# Patient Record
Sex: MALE | Race: WHITE | NOT HISPANIC OR LATINO | Employment: OTHER | URBAN - METROPOLITAN AREA
[De-identification: names, ages, dates, MRNs, and addresses within clinical notes are randomized per-mention and may not be internally consistent; named-entity substitution may affect disease eponyms.]

---

## 2017-10-21 ENCOUNTER — RESULTS ENCOUNTER (OUTPATIENT)
Dept: INTERNAL MEDICINE | Facility: CLINIC | Age: 57
End: 2017-10-21

## 2017-10-21 DIAGNOSIS — Z00.00 HEALTH CARE MAINTENANCE: ICD-10-CM

## 2017-10-24 LAB
ALBUMIN SERPL-MCNC: 4.5 G/DL (ref 3.5–5.2)
ALBUMIN/GLOB SERPL: 1.9 G/DL
ALP SERPL-CCNC: 64 U/L (ref 39–117)
ALT SERPL-CCNC: 26 U/L (ref 1–41)
AST SERPL-CCNC: 19 U/L (ref 1–40)
BILIRUB SERPL-MCNC: 0.3 MG/DL (ref 0.1–1.2)
BUN SERPL-MCNC: 15 MG/DL (ref 6–20)
BUN/CREAT SERPL: 16.1 (ref 7–25)
CALCIUM SERPL-MCNC: 9.1 MG/DL (ref 8.6–10.5)
CHLORIDE SERPL-SCNC: 103 MMOL/L (ref 98–107)
CHOLEST SERPL-MCNC: 159 MG/DL (ref 0–200)
CO2 SERPL-SCNC: 25.6 MMOL/L (ref 22–29)
CREAT SERPL-MCNC: 0.93 MG/DL (ref 0.76–1.27)
FT4I SERPL CALC-MCNC: 1.6 (ref 1.2–4.9)
GFR SERPLBLD CREATININE-BSD FMLA CKD-EPI: 102 ML/MIN/1.73
GFR SERPLBLD CREATININE-BSD FMLA CKD-EPI: 84 ML/MIN/1.73
GLOBULIN SER CALC-MCNC: 2.4 GM/DL
GLUCOSE SERPL-MCNC: 82 MG/DL (ref 65–99)
HCV AB S/CO SERPL IA: 0.1 S/CO RATIO (ref 0–0.9)
HDLC SERPL-MCNC: 54 MG/DL (ref 40–60)
LDLC SERPL CALC-MCNC: 91 MG/DL (ref 0–100)
LDLC/HDLC SERPL: 1.69 {RATIO}
POTASSIUM SERPL-SCNC: 4.8 MMOL/L (ref 3.5–5.2)
PROT SERPL-MCNC: 6.9 G/DL (ref 6–8.5)
PSA SERPL-MCNC: 0.92 NG/ML (ref 0–4)
SODIUM SERPL-SCNC: 142 MMOL/L (ref 136–145)
T3RU NFR SERPL: 25 % (ref 24–39)
T4 SERPL-MCNC: 6.5 UG/DL (ref 4.5–12)
TRIGL SERPL-MCNC: 69 MG/DL (ref 0–150)
TSH SERPL DL<=0.005 MIU/L-ACNC: 2.29 UIU/ML (ref 0.45–4.5)
VLDLC SERPL CALC-MCNC: 13.8 MG/DL (ref 5–40)

## 2017-10-30 ENCOUNTER — OFFICE VISIT (OUTPATIENT)
Dept: INTERNAL MEDICINE | Facility: CLINIC | Age: 57
End: 2017-10-30

## 2017-10-30 VITALS
WEIGHT: 159.9 LBS | SYSTOLIC BLOOD PRESSURE: 106 MMHG | OXYGEN SATURATION: 99 % | DIASTOLIC BLOOD PRESSURE: 60 MMHG | BODY MASS INDEX: 25.7 KG/M2 | HEART RATE: 76 BPM | HEIGHT: 66 IN

## 2017-10-30 DIAGNOSIS — D64.9 ANEMIA, UNSPECIFIED TYPE: Primary | ICD-10-CM

## 2017-10-30 DIAGNOSIS — Z00.00 HEALTH CARE MAINTENANCE: ICD-10-CM

## 2017-10-30 LAB
BASOPHILS # BLD AUTO: 0.02 10*3/MM3 (ref 0–0.2)
BASOPHILS NFR BLD AUTO: 0.6 % (ref 0–1.5)
EOSINOPHIL # BLD AUTO: 0.05 10*3/MM3 (ref 0–0.7)
EOSINOPHIL NFR BLD AUTO: 1.6 % (ref 0.3–6.2)
ERYTHROCYTE [DISTWIDTH] IN BLOOD BY AUTOMATED COUNT: 17.2 % (ref 11.5–14.5)
HCT VFR BLD AUTO: 38.3 % (ref 40.4–52.2)
HGB BLD-MCNC: 11.6 G/DL (ref 13.7–17.6)
IMM GRANULOCYTES # BLD: 0 10*3/MM3 (ref 0–0.03)
IMM GRANULOCYTES NFR BLD: 0 % (ref 0–0.5)
IRON SATN MFR SERPL: 5 % (ref 20–50)
IRON SERPL-MCNC: 25 MCG/DL (ref 59–158)
LYMPHOCYTES # BLD AUTO: 0.74 10*3/MM3 (ref 0.9–4.8)
LYMPHOCYTES NFR BLD AUTO: 23.1 % (ref 19.6–45.3)
MCH RBC QN AUTO: 25.7 PG (ref 27–32.7)
MCHC RBC AUTO-ENTMCNC: 30.3 G/DL (ref 32.6–36.4)
MCV RBC AUTO: 84.9 FL (ref 79.8–96.2)
MONOCYTES # BLD AUTO: 0.36 10*3/MM3 (ref 0.2–1.2)
MONOCYTES NFR BLD AUTO: 11.3 % (ref 5–12)
NEUTROPHILS # BLD AUTO: 2.03 10*3/MM3 (ref 1.9–8.1)
NEUTROPHILS NFR BLD AUTO: 63.4 % (ref 42.7–76)
PLATELET # BLD AUTO: 346 10*3/MM3 (ref 140–500)
RBC # BLD AUTO: 4.51 10*6/MM3 (ref 4.6–6)
TIBC SERPL-MCNC: 455 MCG/DL
UIBC SERPL-MCNC: 430 MCG/DL
WBC # BLD AUTO: 3.2 10*3/MM3 (ref 4.5–10.7)

## 2017-10-30 PROCEDURE — 90471 IMMUNIZATION ADMIN: CPT | Performed by: INTERNAL MEDICINE

## 2017-10-30 PROCEDURE — 90686 IIV4 VACC NO PRSV 0.5 ML IM: CPT | Performed by: INTERNAL MEDICINE

## 2017-10-30 PROCEDURE — 99396 PREV VISIT EST AGE 40-64: CPT | Performed by: INTERNAL MEDICINE

## 2017-10-30 NOTE — PROGRESS NOTES
"Subjective   Travis Taylor is a 57 y.o. male and is here for a comprehensive physical exam. The patient reports problems - anemia.    He gives blood every 2 months.  He was told 1x he was too anemic    Do you take any herbs or supplements that were not prescribed by a doctor? See list on       Social History:   Social History     Social History   • Marital status:      Spouse name: JOSE   • Number of children: 2   • Years of education: N/A     Occupational History   • [employer],       Social History Main Topics   • Smoking status: Never Smoker   • Smokeless tobacco: Never Used   • Alcohol use Yes      Comment: socially.   • Drug use: No   • Sexual activity: Not on file     Other Topics Concern   • Not on file     Social History Narrative    2 grown children       Family History:   Family History   Problem Relation Age of Onset   • Hypertension Mother    • Stroke Father 85   • Lung cancer Paternal Grandfather      & possible mouth cancer; was a smoker.        Past Medical History: History reviewed. No pertinent past medical history.        Review of Systems    A comprehensive review of systems was negative.    Objective   /60  Pulse 76  Ht 66\" (167.6 cm)  Wt 159 lb 14.4 oz (72.5 kg)  SpO2 99%  BMI 25.81 kg/m2    General Appearance:    Alert, cooperative, no distress, appears stated age   Head:    Normocephalic, without obvious abnormality, atraumatic   Eyes:    PERRL, conjunctiva/corneas clear, EOM's intact, fundi     benign, both eyes        Ears:    Normal TM's and external ear canals, both ears   Nose:   Nares normal, septum midline, mucosa normal, no drainage    or sinus tenderness   Throat:   Lips, mucosa, and tongue normal; teeth and gums normal   Neck:   Supple, symmetrical, trachea midline, no adenopathy;        thyroid:  No enlargement/tenderness/nodules; no carotid    bruit or JVD   Back:     Symmetric, no curvature, ROM normal, no CVA tenderness   Lungs:     Clear to " auscultation bilaterally, respirations unlabored   Chest wall:    No tenderness or deformity   Heart:    Regular rate and rhythm, S1 and S2 normal, no murmur, rub   or gallop   Abdomen:     Soft, non-tender, bowel sounds active all four quadrants,     no masses, no organomegaly           Extremities:   Extremities normal, atraumatic, no cyanosis or edema   Pulses:   2+ and symmetric all extremities   Skin:   Skin color, texture, turgor normal, no rashes or lesions   Lymph nodes:   Cervical, supraclavicular, and axillary nodes normal   Neurologic:   CNII-XII intact. Normal strength, sensation and reflexes       throughout         Medications:   Current Outpatient Prescriptions:   •  Calcium-Magnesium-Vitamin D (CALCIUM 1200+D3 PO), Take 1 capsule by mouth daily., Disp: , Rfl:   •  Multiple Vitamin (MULTIVITAMIN) tablet, Take 1 tablet by mouth daily., Disp: , Rfl:   •  Multiple Vitamins-Minerals (ANTIOXIDANT PO), Take 1 tablet by mouth daily., Disp: , Rfl:   •  vitamin C (ASCORBIC ACID) 500 MG tablet, Take 500 mg by mouth daily., Disp: , Rfl:   •  Zinc 50 MG capsule, Take 1 capsule by mouth daily., Disp: , Rfl:        Assessment/Plan   Healthy male exam.      1. Healthcare Maintenance:  2. Patient Counseling:  --Nutrition: Stressed importance of moderation in sodium/caffeine intake, saturated fat and cholesterol, caloric balance, sufficient intake of fresh fruits, vegetables, fiber, calcium and vit D  --Exercise: I have rec 30 minute  --Substance Abuse: no tob occas etoh  --Dental health: He does go to the dentis reg  --Immunizations reviewed.UTD  --Discussed benefits of screening colonoscopy repeat 2025  3.  Anemia- patient has had a slightly low hemoglobin a couple of times and he went to give blood.  He is taking a multivitamin a longer has iron in it and he donates blood at least once every 2 months.  I'm going to recheck his iron level today.  I did advise him that he may need to take iron for a week after his  blood donation

## 2017-11-01 ENCOUNTER — TELEPHONE (OUTPATIENT)
Dept: INTERNAL MEDICINE | Facility: CLINIC | Age: 57
End: 2017-11-01

## 2017-11-01 DIAGNOSIS — D50.9 IRON DEFICIENCY ANEMIA, UNSPECIFIED IRON DEFICIENCY ANEMIA TYPE: Primary | ICD-10-CM

## 2017-11-01 NOTE — TELEPHONE ENCOUNTER
Pt states that he donates blood every 8 weeks and will recheck his labs in 2 weeks but wants to wait on the iron supp to see if his body regulates with the iron storage. If he is still low he said that he will start the iron supp. He donates blood every 8 weeks. Lab ordered for 2 weeks and appt. Scheduled for labs.

## 2017-11-01 NOTE — TELEPHONE ENCOUNTER
----- Message from Carlie Dean sent at 11/1/2017  9:40 AM EDT -----      ----- Message -----     From: Dawna Lima MD     Sent: 10/31/2017  12:57 PM       To: Carlie Salguero MA    Patient's iron level is low and he is a little bit anemic.  He needs to take some iron every day for the next couple of weeks and then recheck an iron level and a CBC.  Make sure he is up-to-date with his colonoscopy

## 2017-11-13 ENCOUNTER — RESULTS ENCOUNTER (OUTPATIENT)
Dept: INTERNAL MEDICINE | Facility: CLINIC | Age: 57
End: 2017-11-13

## 2017-11-13 DIAGNOSIS — D50.9 IRON DEFICIENCY ANEMIA, UNSPECIFIED IRON DEFICIENCY ANEMIA TYPE: ICD-10-CM

## 2017-11-14 LAB
BASOPHILS # BLD AUTO: 0.03 10*3/MM3 (ref 0–0.2)
BASOPHILS NFR BLD AUTO: 0.8 % (ref 0–1.5)
EOSINOPHIL # BLD AUTO: 0.08 10*3/MM3 (ref 0–0.7)
EOSINOPHIL NFR BLD AUTO: 2.2 % (ref 0.3–6.2)
ERYTHROCYTE [DISTWIDTH] IN BLOOD BY AUTOMATED COUNT: 17 % (ref 11.5–14.5)
HCT VFR BLD AUTO: 38.4 % (ref 40.4–52.2)
HGB BLD-MCNC: 11.5 G/DL (ref 13.7–17.6)
IMM GRANULOCYTES # BLD: 0 10*3/MM3 (ref 0–0.03)
IMM GRANULOCYTES NFR BLD: 0 % (ref 0–0.5)
IRON SATN MFR SERPL: 5 % (ref 20–50)
IRON SERPL-MCNC: 25 MCG/DL (ref 59–158)
LYMPHOCYTES # BLD AUTO: 0.8 10*3/MM3 (ref 0.9–4.8)
LYMPHOCYTES NFR BLD AUTO: 22 % (ref 19.6–45.3)
MCH RBC QN AUTO: 25.4 PG (ref 27–32.7)
MCHC RBC AUTO-ENTMCNC: 29.9 G/DL (ref 32.6–36.4)
MCV RBC AUTO: 85 FL (ref 79.8–96.2)
MONOCYTES # BLD AUTO: 0.59 10*3/MM3 (ref 0.2–1.2)
MONOCYTES NFR BLD AUTO: 16.2 % (ref 5–12)
NEUTROPHILS # BLD AUTO: 2.14 10*3/MM3 (ref 1.9–8.1)
NEUTROPHILS NFR BLD AUTO: 58.8 % (ref 42.7–76)
PLATELET # BLD AUTO: 373 10*3/MM3 (ref 140–500)
RBC # BLD AUTO: 4.52 10*6/MM3 (ref 4.6–6)
TIBC SERPL-MCNC: 477 MCG/DL
UIBC SERPL-MCNC: 452 MCG/DL
WBC # BLD AUTO: 3.64 10*3/MM3 (ref 4.5–10.7)

## 2017-11-22 ENCOUNTER — TELEPHONE (OUTPATIENT)
Dept: INTERNAL MEDICINE | Facility: CLINIC | Age: 57
End: 2017-11-22

## 2017-11-22 NOTE — TELEPHONE ENCOUNTER
PT. HAD LABS DRAWN TO RECHECK ANEMIA DUE TO DONATING BLOOD EVERY 8 WEEKS. MESSAGE SENT TO DR. POWELL TO ADVISE F/U ON ANEMIA.

## 2017-11-27 ENCOUNTER — TELEPHONE (OUTPATIENT)
Dept: INTERNAL MEDICINE | Facility: CLINIC | Age: 57
End: 2017-11-27

## 2017-11-27 NOTE — TELEPHONE ENCOUNTER
----- Message from Dawna Lima MD sent at 11/27/2017  1:14 PM EST -----  Hold off on donating blood for a few months take some iron daily with vitc or juice and recheck in 2 months an iron panel and cbc  ----- Message -----     From: Carliemigue Dean     Sent: 11/22/2017   4:08 PM       To: Dawna Lima MD    PT. HAD LABS DRAWN FOR RECHECK ANEMIA DUE TO DONATING BLOOD EVERY 8 WEEKS, PLEASE ADVISE.   HIS NEXT OV IS NOT UNTIL 1 YEAR.     DISCUSSED WITH PATIENT

## 2018-03-15 DIAGNOSIS — D50.9 IRON DEFICIENCY ANEMIA, UNSPECIFIED IRON DEFICIENCY ANEMIA TYPE: Primary | ICD-10-CM

## 2018-03-16 LAB
BASOPHILS # BLD AUTO: 0.02 10*3/MM3 (ref 0–0.2)
BASOPHILS NFR BLD AUTO: 0.6 % (ref 0–1.5)
EOSINOPHIL # BLD AUTO: 0.08 10*3/MM3 (ref 0–0.7)
EOSINOPHIL NFR BLD AUTO: 2.4 % (ref 0.3–6.2)
ERYTHROCYTE [DISTWIDTH] IN BLOOD BY AUTOMATED COUNT: 18 % (ref 11.5–14.5)
HCT VFR BLD AUTO: 47.6 % (ref 40.4–52.2)
HGB BLD-MCNC: 15.3 G/DL (ref 13.7–17.6)
IMM GRANULOCYTES # BLD: 0 10*3/MM3 (ref 0–0.03)
IMM GRANULOCYTES NFR BLD: 0 % (ref 0–0.5)
IRON SERPL-MCNC: 75 MCG/DL (ref 59–158)
LYMPHOCYTES # BLD AUTO: 0.71 10*3/MM3 (ref 0.9–4.8)
LYMPHOCYTES NFR BLD AUTO: 20.9 % (ref 19.6–45.3)
MCH RBC QN AUTO: 30 PG (ref 27–32.7)
MCHC RBC AUTO-ENTMCNC: 32.1 G/DL (ref 32.6–36.4)
MCV RBC AUTO: 93.3 FL (ref 79.8–96.2)
MONOCYTES # BLD AUTO: 0.56 10*3/MM3 (ref 0.2–1.2)
MONOCYTES NFR BLD AUTO: 16.5 % (ref 5–12)
NEUTROPHILS # BLD AUTO: 2.02 10*3/MM3 (ref 1.9–8.1)
NEUTROPHILS NFR BLD AUTO: 59.6 % (ref 42.7–76)
PLATELET # BLD AUTO: 264 10*3/MM3 (ref 140–500)
RBC # BLD AUTO: 5.1 10*6/MM3 (ref 4.6–6)
WBC # BLD AUTO: 3.39 10*3/MM3 (ref 4.5–10.7)

## 2018-03-23 ENCOUNTER — TELEPHONE (OUTPATIENT)
Dept: INTERNAL MEDICINE | Facility: CLINIC | Age: 58
End: 2018-03-23

## 2018-03-23 DIAGNOSIS — D50.9 IRON DEFICIENCY ANEMIA, UNSPECIFIED IRON DEFICIENCY ANEMIA TYPE: Primary | ICD-10-CM

## 2018-03-23 NOTE — TELEPHONE ENCOUNTER
PT AWARE . LABS ORDERED    ----- Message from Dawna Lima MD sent at 3/23/2018  7:35 AM EDT -----  Regarding: RE: LAB RESULT QUESTIONS  Contact: [phone]  hld iron and recheck cbc and iron in 2months  If ok then he may resume donating blood    ----- Message -----  From: Carlie Salugero MA  Sent: 3/22/2018   2:14 PM  To: Dawna Lima MD  Subject: FW: LAB RESULT QUESTIONS                         PT HAS BEEN TAKING AN IRON SUPPLEMENT, DOES HE CONTINUE? ALSO DO YOU THINK HE CAN CONTINUE TO DONATE BLOOD?  ----- Message -----  From: Ramona Landry  Sent: 3/22/2018   1:32 PM  To: Carlie Salguero MA  Subject: LAB RESULT QUESTIONS                             Patient received your message about his lab results. He would like for you to call him concerning some questions he has. Thanks

## 2018-05-07 ENCOUNTER — CLINICAL SUPPORT (OUTPATIENT)
Dept: INTERNAL MEDICINE | Facility: CLINIC | Age: 58
End: 2018-05-07

## 2018-05-07 DIAGNOSIS — Z23 IMMUNIZATION DUE: Primary | ICD-10-CM

## 2018-05-07 PROCEDURE — 90471 IMMUNIZATION ADMIN: CPT | Performed by: INTERNAL MEDICINE

## 2018-05-07 PROCEDURE — 90632 HEPA VACCINE ADULT IM: CPT | Performed by: INTERNAL MEDICINE

## 2018-05-23 ENCOUNTER — RESULTS ENCOUNTER (OUTPATIENT)
Dept: INTERNAL MEDICINE | Facility: CLINIC | Age: 58
End: 2018-05-23

## 2018-05-23 DIAGNOSIS — D50.9 IRON DEFICIENCY ANEMIA, UNSPECIFIED IRON DEFICIENCY ANEMIA TYPE: ICD-10-CM

## 2018-05-25 LAB
BASOPHILS # BLD AUTO: 0.02 10*3/MM3 (ref 0–0.2)
BASOPHILS NFR BLD AUTO: 0.7 % (ref 0–1.5)
EOSINOPHIL # BLD AUTO: 0.07 10*3/MM3 (ref 0–0.7)
EOSINOPHIL NFR BLD AUTO: 2.4 % (ref 0.3–6.2)
ERYTHROCYTE [DISTWIDTH] IN BLOOD BY AUTOMATED COUNT: 14.2 % (ref 11.5–14.5)
HCT VFR BLD AUTO: 45 % (ref 40.4–52.2)
HGB BLD-MCNC: 14.2 G/DL (ref 13.7–17.6)
IMM GRANULOCYTES # BLD: 0 10*3/MM3 (ref 0–0.03)
IMM GRANULOCYTES NFR BLD: 0 % (ref 0–0.5)
IRON SERPL-MCNC: 96 MCG/DL (ref 59–158)
LYMPHOCYTES # BLD AUTO: 0.68 10*3/MM3 (ref 0.9–4.8)
LYMPHOCYTES NFR BLD AUTO: 23.4 % (ref 19.6–45.3)
MCH RBC QN AUTO: 31.3 PG (ref 27–32.7)
MCHC RBC AUTO-ENTMCNC: 31.6 G/DL (ref 32.6–36.4)
MCV RBC AUTO: 99.3 FL (ref 79.8–96.2)
MONOCYTES # BLD AUTO: 0.34 10*3/MM3 (ref 0.2–1.2)
MONOCYTES NFR BLD AUTO: 11.7 % (ref 5–12)
NEUTROPHILS # BLD AUTO: 1.8 10*3/MM3 (ref 1.9–8.1)
NEUTROPHILS NFR BLD AUTO: 61.8 % (ref 42.7–76)
PLATELET # BLD AUTO: 255 10*3/MM3 (ref 140–500)
RBC # BLD AUTO: 4.53 10*6/MM3 (ref 4.6–6)
WBC # BLD AUTO: 2.91 10*3/MM3 (ref 4.5–10.7)

## 2018-05-29 DIAGNOSIS — D72.819 LEUKOPENIA, UNSPECIFIED TYPE: Primary | ICD-10-CM

## 2018-05-30 LAB
FOLATE SERPL-MCNC: >20 NG/ML (ref 4.78–24.2)
Lab: NORMAL
VIT B12 SERPL-MCNC: 464 PG/ML (ref 211–946)
WRITTEN AUTHORIZATION: NORMAL

## 2018-06-06 ENCOUNTER — APPOINTMENT (OUTPATIENT)
Dept: OTHER | Facility: HOSPITAL | Age: 58
End: 2018-06-06

## 2018-06-28 ENCOUNTER — LAB (OUTPATIENT)
Dept: OTHER | Facility: HOSPITAL | Age: 58
End: 2018-06-28

## 2018-06-28 ENCOUNTER — CONSULT (OUTPATIENT)
Dept: ONCOLOGY | Facility: CLINIC | Age: 58
End: 2018-06-28

## 2018-06-28 VITALS
TEMPERATURE: 98.7 F | WEIGHT: 157 LBS | OXYGEN SATURATION: 97 % | HEIGHT: 67 IN | DIASTOLIC BLOOD PRESSURE: 80 MMHG | HEART RATE: 77 BPM | SYSTOLIC BLOOD PRESSURE: 122 MMHG | BODY MASS INDEX: 24.64 KG/M2 | RESPIRATION RATE: 16 BRPM

## 2018-06-28 DIAGNOSIS — D72.819 LEUKOPENIA, UNSPECIFIED TYPE: Primary | ICD-10-CM

## 2018-06-28 DIAGNOSIS — D50.9 IRON DEFICIENCY ANEMIA, UNSPECIFIED IRON DEFICIENCY ANEMIA TYPE: ICD-10-CM

## 2018-06-28 LAB
BASOPHILS # BLD AUTO: 0.04 10*3/MM3 (ref 0–0.2)
BASOPHILS NFR BLD AUTO: 1.2 % (ref 0–1.5)
DEPRECATED RDW RBC AUTO: 45.2 FL (ref 37–54)
EOSINOPHIL # BLD AUTO: 0.07 10*3/MM3 (ref 0–0.7)
EOSINOPHIL NFR BLD AUTO: 2 % (ref 0.3–6.2)
ERYTHROCYTE [DISTWIDTH] IN BLOOD BY AUTOMATED COUNT: 13.2 % (ref 11.5–14.5)
HCT VFR BLD AUTO: 45.5 % (ref 40.4–52.2)
HGB BLD-MCNC: 15.6 G/DL (ref 13.7–17.6)
IMM GRANULOCYTES # BLD: 0.03 10*3/MM3 (ref 0–0.03)
IMM GRANULOCYTES NFR BLD: 0.9 % (ref 0–0.5)
LYMPHOCYTES # BLD AUTO: 0.57 10*3/MM3 (ref 0.9–4.8)
LYMPHOCYTES NFR BLD AUTO: 16.6 % (ref 19.6–45.3)
MCH RBC QN AUTO: 32 PG (ref 27–32.7)
MCHC RBC AUTO-ENTMCNC: 34.3 G/DL (ref 32.6–36.4)
MCV RBC AUTO: 93.2 FL (ref 79.8–96.2)
MONOCYTES # BLD AUTO: 0.39 10*3/MM3 (ref 0.2–1.2)
MONOCYTES NFR BLD AUTO: 11.3 % (ref 5–12)
NEUTROPHILS # BLD AUTO: 2.34 10*3/MM3 (ref 1.9–8.1)
NEUTROPHILS NFR BLD AUTO: 68 % (ref 42.7–76)
NRBC BLD MANUAL-RTO: 0 /100 WBC (ref 0–0)
PLATELET # BLD AUTO: 254 10*3/MM3 (ref 140–500)
PMV BLD AUTO: 8.8 FL (ref 6–12)
RBC # BLD AUTO: 4.88 10*6/MM3 (ref 4.6–6)
WBC NRBC COR # BLD: 3.44 10*3/MM3 (ref 4.5–10.7)

## 2018-06-28 PROCEDURE — 99244 OFF/OP CNSLTJ NEW/EST MOD 40: CPT | Performed by: INTERNAL MEDICINE

## 2018-06-28 PROCEDURE — 85025 COMPLETE CBC W/AUTO DIFF WBC: CPT | Performed by: INTERNAL MEDICINE

## 2018-06-28 PROCEDURE — 36415 COLL VENOUS BLD VENIPUNCTURE: CPT

## 2018-09-24 ENCOUNTER — LAB (OUTPATIENT)
Dept: OTHER | Facility: HOSPITAL | Age: 58
End: 2018-09-24

## 2018-09-24 DIAGNOSIS — D50.9 IRON DEFICIENCY ANEMIA, UNSPECIFIED IRON DEFICIENCY ANEMIA TYPE: ICD-10-CM

## 2018-09-24 DIAGNOSIS — D72.819 LEUKOPENIA, UNSPECIFIED TYPE: ICD-10-CM

## 2018-09-24 LAB
BASOPHILS # BLD AUTO: 0.03 10*3/MM3 (ref 0–0.2)
BASOPHILS NFR BLD AUTO: 0.8 % (ref 0–1.5)
DEPRECATED RDW RBC AUTO: 48.1 FL (ref 37–54)
EOSINOPHIL # BLD AUTO: 0.05 10*3/MM3 (ref 0–0.7)
EOSINOPHIL NFR BLD AUTO: 1.4 % (ref 0.3–6.2)
ERYTHROCYTE [DISTWIDTH] IN BLOOD BY AUTOMATED COUNT: 13.6 % (ref 11.5–14.5)
FERRITIN SERPL-MCNC: 30.4 NG/ML (ref 30–400)
HCT VFR BLD AUTO: 49 % (ref 40.4–52.2)
HGB BLD-MCNC: 16.4 G/DL (ref 13.7–17.6)
IMM GRANULOCYTES # BLD: 0.01 10*3/MM3 (ref 0–0.03)
IMM GRANULOCYTES NFR BLD: 0.3 % (ref 0–0.5)
IRON 24H UR-MRATE: 120 MCG/DL (ref 59–158)
IRON SATN MFR SERPL: 34 % (ref 20–50)
LYMPHOCYTES # BLD AUTO: 0.69 10*3/MM3 (ref 0.9–4.8)
LYMPHOCYTES NFR BLD AUTO: 19.4 % (ref 19.6–45.3)
MCH RBC QN AUTO: 31.7 PG (ref 27–32.7)
MCHC RBC AUTO-ENTMCNC: 33.5 G/DL (ref 32.6–36.4)
MCV RBC AUTO: 94.8 FL (ref 79.8–96.2)
MONOCYTES # BLD AUTO: 0.43 10*3/MM3 (ref 0.2–1.2)
MONOCYTES NFR BLD AUTO: 12.1 % (ref 5–12)
NEUTROPHILS # BLD AUTO: 2.35 10*3/MM3 (ref 1.9–8.1)
NEUTROPHILS NFR BLD AUTO: 66 % (ref 42.7–76)
NRBC BLD MANUAL-RTO: 0 /100 WBC (ref 0–0)
PLATELET # BLD AUTO: 255 10*3/MM3 (ref 140–500)
PMV BLD AUTO: 8.4 FL (ref 6–12)
RBC # BLD AUTO: 5.17 10*6/MM3 (ref 4.6–6)
TIBC SERPL-MCNC: 356 MCG/DL (ref 298–536)
TRANSFERRIN SERPL-MCNC: 239 MG/DL (ref 200–360)
VIT B12 BLD-MCNC: 719 PG/ML (ref 211–946)
WBC NRBC COR # BLD: 3.56 10*3/MM3 (ref 4.5–10.7)

## 2018-09-24 PROCEDURE — 36415 COLL VENOUS BLD VENIPUNCTURE: CPT

## 2018-09-24 PROCEDURE — 85025 COMPLETE CBC W/AUTO DIFF WBC: CPT | Performed by: INTERNAL MEDICINE

## 2018-09-24 PROCEDURE — 83540 ASSAY OF IRON: CPT | Performed by: INTERNAL MEDICINE

## 2018-09-24 PROCEDURE — 82607 VITAMIN B-12: CPT | Performed by: INTERNAL MEDICINE

## 2018-09-24 PROCEDURE — 84466 ASSAY OF TRANSFERRIN: CPT | Performed by: INTERNAL MEDICINE

## 2018-09-24 PROCEDURE — 82728 ASSAY OF FERRITIN: CPT | Performed by: INTERNAL MEDICINE

## 2018-09-27 ENCOUNTER — APPOINTMENT (OUTPATIENT)
Dept: OTHER | Facility: HOSPITAL | Age: 58
End: 2018-09-27

## 2018-09-27 ENCOUNTER — OFFICE VISIT (OUTPATIENT)
Dept: ONCOLOGY | Facility: CLINIC | Age: 58
End: 2018-09-27

## 2018-09-27 VITALS
TEMPERATURE: 98.2 F | BODY MASS INDEX: 24.4 KG/M2 | OXYGEN SATURATION: 97 % | HEART RATE: 73 BPM | WEIGHT: 155.5 LBS | RESPIRATION RATE: 16 BRPM | SYSTOLIC BLOOD PRESSURE: 104 MMHG | DIASTOLIC BLOOD PRESSURE: 71 MMHG | HEIGHT: 67 IN

## 2018-09-27 DIAGNOSIS — D50.9 IRON DEFICIENCY ANEMIA, UNSPECIFIED IRON DEFICIENCY ANEMIA TYPE: Primary | ICD-10-CM

## 2018-09-27 DIAGNOSIS — D72.819 LEUKOPENIA, UNSPECIFIED TYPE: ICD-10-CM

## 2018-09-27 PROCEDURE — G0463 HOSPITAL OUTPT CLINIC VISIT: HCPCS | Performed by: INTERNAL MEDICINE

## 2018-09-27 PROCEDURE — 99213 OFFICE O/P EST LOW 20 MIN: CPT | Performed by: INTERNAL MEDICINE

## 2018-09-27 RX ORDER — LANOLIN ALCOHOL/MO/W.PET/CERES
1000 CREAM (GRAM) TOPICAL DAILY
COMMUNITY
End: 2020-06-30 | Stop reason: HOSPADM

## 2018-09-27 NOTE — PROGRESS NOTES
.     REASON FOR FOLLOWUP:     1.  Asymptomatic mild leukocytopenia/lymphocytopenia.   2.  Low normal vitamin B12 level, started on oral B12 supplementation in the end of June 2018.   3.  History of iron deficiency anemia secondary to frequent blood donation.  Was on oral iron treatment for 6 month with normalization of hemoglobin.        HISTORY OF PRESENT ILLNESS:  The patient is a 58 y.o. year old male who is here for 3-month re-evaluation of his chronic leukocytopenia, 3 days after repeated laboratory studies.       Patient reports that he is doing well with excellent performance status ECOG 0.  He takes oral vitamin B12 (OTC) at 1000 µg daily for the past 3 months as instructed.  Patient reports he noticed no benefits from oral vitamin B12, normal side effects.  He does not take oral iron. He does not take prescribed medication. He is on multiple supplementations including calcium, vitamin D, multivitamins, vitamin C and zinc.     He denies recurrent infections, fever, sweating, chills. No weight loss. He has no specific complaints except sciatic nerve, which causes pain in the leg and interferes with his walking at times.      Laboratory study on 9/24/2018 reported WBC 3560 including neutrophils 2350, lymphocytes 690 and monocytes 430.  Platelets 255,000.  None of those numbers change significantly compared to those 3 months ago.  His hemoglobin further improved at a 16.4, with hematocrit 49.0% and MCV 94.8 MCHC 33.5.  His B12 level improved at 719 pg/mL.  His serum ferritin 30.4 ng/mm, free iron 120 TIBC 356 and iron saturation 34%.     Past Medical History:   Diagnosis Date   • DDD (degenerative disc disease), lumbar     L4-5   • H/O Broken jaw (CMS/HCC) 1968   • History of concussion 1968   • History of jaundice    • Leukopenia 2018     Past Surgical History:   Procedure Laterality Date   • COLONOSCOPY  2012   • HERNIA REPAIR      two serperate surgeries done in 2009 & the other in 2012   • INNER EAR  SURGERY Right 1983    Punctured drum       HEMATOLOGIC/ONCOLOGIC HISTORY:   The patient is a 58 y.o. year old male who was here on 6/28/2018 for Initial evaluation because of his chronic leukocytopenia.       Patient reports that he is doing well with excellent performance status ECOG 0. He denies recurrent infections, fever, sweating, chills. No weight loss. He has no specific complaints except sciatic nerve, which causes pain in the leg and interferes with his walking at times. Otherwise, he is doing well. He does not take prescribed medication. He is on multiple supplementations including calcium, vitamin D, multivitamins, vitamin C and zinc.     This patient had history of iron deficiency in later part of 2017 and was taking oral iron treatment. Apparently that was discontinued in 03/2018. Upon further questioning, patient reports he actually donates blood oftentimes about 4-5 times per year. However, since his diagnosis of iron deficiency, he has not resumed donation yet.     I reviewed all his laboratory study results available in the Epic system. The earliest was from 10/16/2016 when he had normal CMP and there were no CBC results. On 10/30/2017, he had hemoglobin 11.6, MCV 84.9, MCHC 30.3. Platelets were 346,000 and WBC 3200 including neutrophils 2030, lymphocytes 740, monocytes 360. Serum iron was 25, TIBC 455 and iron saturation 5% without ferritin level. On 11/13/2017, he had hemoglobin 11.5, MCV 85.0, MCHC 29.9. Platelets 373,000 and WBC 3640 including neutrophils 2140, lymphocytes 800, monocytes 590. His iron study showed free iron 25, TIBC 477 and iron saturation 5% without ferritin level.  He was taking oral iron at that time.  Laboratory study on 03/16/2018 reported normalized hemoglobin 15.3, MCV 93.3, MCHC 32.1. Platelets were 264,000 and WBC 3390 including neutrophils 2020, lymphocytes 710 and monocytes 560. Free iron was 75 without iron saturation.  Oral iron treatment was discontinued  then.    Laboratory study on 05/25/2018 reported hemoglobin 14.2, MCV 99.3, MCHC 31.6. Platelets 255,000 and WBC 2910 including neutrophils 1800 and lymphocytes 680, monocytes 340. Serum free iron was 96. Folic acid more than 20 ng/mL and vitamin B12 at 464 pg/mL. There was no ferritin level nor iron saturation.      Laboratory study on 6/28/2018 reported WBC 3440, including neutrophils 2340, lymphocytes 570 and monocytes 390.  Peripheral blood smear reviewed.  There is no apparent abnormalities in morphologis of WBC population.  Also normal RBC and the platelets.  No evidence of hematology malignancy.    Patient was started on oral vitamin B12 at 1000 µg daily at the end of June 2018.     MEDICATIONS    Current Outpatient Prescriptions:   •  calcium citrate-vitamin d (CALCIUM CITRATE + D3) 200-250 MG-UNIT tablet tablet, Take  by mouth Daily., Disp: , Rfl:   •  Multiple Vitamin (MULTIVITAMIN) tablet, Take 1 tablet by mouth daily., Disp: , Rfl:   •  Multiple Vitamins-Minerals (ANTIOXIDANT PO), Take 1 tablet by mouth daily., Disp: , Rfl:   •  vitamin B-12 (CYANOCOBALAMIN) 1000 MCG tablet, Take 1,000 mcg by mouth Daily., Disp: , Rfl:   •  vitamin C (ASCORBIC ACID) 500 MG tablet, Take 500 mg by mouth daily., Disp: , Rfl:   •  Zinc 50 MG capsule, Take 1 capsule by mouth daily., Disp: , Rfl:   •  Calcium-Magnesium-Vitamin D (CALCIUM 1200+D3 PO), Take 1 capsule by mouth daily., Disp: , Rfl:     ALLERGIES:     Allergies   Allergen Reactions   • Penicillins Other (See Comments)       SOCIAL HISTORY:       Social History     Social History   • Marital status:      Spouse name: JOSE   • Number of children: 2 grown children   • Years of education:   College education     Occupational History   • 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     Social History Main Topics   • Smoking status: Never Smoker   • Smokeless tobacco: Never Used   • Alcohol use Yes      Comment: socially   3 drinks per week.   • Drug  "use: No   • Sexual activity: Not on file         FAMILY HISTORY:  Family History   Problem Relation Age of Onset   • Hypertension Mother    • Arthritis Mother    • Diabetes Mother    • Stroke Father 85   • Dementia Father    • Depression Father    • Lung cancer Paternal Grandfather         & possible mouth cancer; was a smoker.    Mother has hypertension and diabetes, and gallbladder stone, alive in her mid 80s in 2018.   Uncle has heart disease.      REVIEW OF SYSTEMS:  Review of Systems   Constitutional: Negative for appetite change, chills, fatigue, fever and unexpected weight change.   HENT: Negative for congestion, mouth sores, rhinorrhea, sinus pressure, sore throat and trouble swallowing.    Eyes: Negative for visual disturbance.   Respiratory: Negative for cough and shortness of breath.    Cardiovascular: Negative for chest pain, palpitations and leg swelling.   Gastrointestinal: Negative for abdominal pain, blood in stool, constipation, diarrhea, nausea and vomiting.   Endocrine: Negative for cold intolerance.   Genitourinary: Negative for dysuria and hematuria.   Musculoskeletal: Positive for back pain. Negative for joint swelling.        Has sciatic nerve, and pain in the leg   Skin: Negative for color change.   Allergic/Immunologic: Negative for food allergies.   Neurological: Negative for numbness and headaches.   Hematological: Negative for adenopathy. Does not bruise/bleed easily.   Psychiatric/Behavioral: Negative for confusion.              Vitals:    09/27/18 0932   BP: 104/71   Pulse: 73   Resp: 16   Temp: 98.2 °F (36.8 °C)   TempSrc: Oral   SpO2: 97%   Weight: 70.5 kg (155 lb 8 oz)   Height: 169 cm (66.54\")   PainSc: 0-No pain     Current Status 9/27/2018   ECOG score 0      PHYSICAL EXAM:      CONSTITUTIONAL:   Well-developed well-nourished male. No distress, looks comfortable.  EYES:  Conjunctiva and lids unremarkable.   EARS,NOSE,MOUTH,THROAT:  Ears and nose appear unremarkable.  Lips appear " unremarkable.   Oral mucosa moist.  RESPIRATORY:  Normal respiratory effort.  Lungs clear to auscultation bilaterally.  CARDIOVASCULAR:  Regular rhythm and rate, no murmurs.  Normal S1, S2.    GASTROINTESTINAL: Abdomen no tender.  No hepatomegaly.  No splenomegaly.   Bowel sounds normal.  LYMPHATIC:  No cervical lymphadenopathy.  MUSCULOSKELETAL:   No cyanosis or clubbing. No lower extremity edema.   SKIN:  Warm.  No rashes.  PSYCHIATRIC:  Normal judgment and insight.  Normal mood and affect.      RECENT LABS:      Lab Results   Component Value Date    WBC 3.56 (L) 09/24/2018    HGB 16.4 09/24/2018    HCT 49.0 09/24/2018    MCV 94.8 09/24/2018     09/24/2018     Lab Results   Component Value Date    NEUTROABS 2.35 09/24/2018     Lab Results   Component Value Date    NAQRQBFZ66 719 09/24/2018     Lab Results   Component Value Date    IRON 120 09/24/2018    TIBC 356 09/24/2018    FERRITIN 30.40 09/24/2018     Iron saturation 34%      Assessment/Plan   1.  Leukopenia, mostly with lymphocytopenia and his neutrophil has been low normal for the most time. He is asymptomatic.  Review of peripheral blood smear was no suspicion for hematology malignancy.      Laboratory study in May 2018 showed low normal vitamin B12 level together with macrocytosis, suspect that he might have functional vitamin B12 deficiency.  We recommended him starting oral vitamin B12 at 1000 µg daily, and he has been doing that for the past 3 months.  Vitamin B12 slightly improved, however there is no any improvement of his counts of lymphocytes and the neutrophils.      Because he has no side effects from oral vitamin B12, I recommended him to continue take this.    2.   History of Iron deficiency anemia.   That is secondary to his frequent donation of blood.    His anemia responded to oral iron treatment very nicely, and he stoped oral iron treatment after 6 months.  He has not resumed donation yet.     His laboratory studies on 9/24/2018  showed significantly improved iron saturation at 34%, but still has relatively low ferritin level at 30.4 ng/mL.      We'll bring him back in 6 months for reevaluation.  I advised him not to resume donation of blood.  He voiced understanding.       PLAN:  1.   Continue oral Vitamin B12 at 1000 µg daily.  2.   Patient will return for M.D. visit in 6 months, will obtain laboratory studies with cbc, B12, ferritin, iron profile.         DANNIE RICHARDSON M.D., Ph.D.     9/27/2018      CC:   Dawna Lima MD     Dictated using Dragon Dictation.

## 2018-09-28 ENCOUNTER — FLU SHOT (OUTPATIENT)
Dept: INTERNAL MEDICINE | Facility: CLINIC | Age: 58
End: 2018-09-28

## 2018-09-28 DIAGNOSIS — Z23 NEED FOR INFLUENZA VACCINATION: Primary | ICD-10-CM

## 2018-09-28 PROCEDURE — 90674 CCIIV4 VAC NO PRSV 0.5 ML IM: CPT | Performed by: INTERNAL MEDICINE

## 2018-09-28 PROCEDURE — 90471 IMMUNIZATION ADMIN: CPT | Performed by: INTERNAL MEDICINE

## 2018-10-23 DIAGNOSIS — D64.9 ANEMIA, UNSPECIFIED TYPE: ICD-10-CM

## 2018-10-23 DIAGNOSIS — Z00.00 HEALTH CARE MAINTENANCE: ICD-10-CM

## 2018-10-26 LAB
ALBUMIN SERPL-MCNC: 4.4 G/DL (ref 3.5–5.2)
ALBUMIN/GLOB SERPL: 1.8 G/DL
ALP SERPL-CCNC: 68 U/L (ref 39–117)
ALT SERPL-CCNC: 27 U/L (ref 1–41)
AST SERPL-CCNC: 16 U/L (ref 1–40)
BASOPHILS # BLD AUTO: 0.02 10*3/MM3 (ref 0–0.2)
BASOPHILS NFR BLD AUTO: 0.6 % (ref 0–1.5)
BILIRUB SERPL-MCNC: 0.6 MG/DL (ref 0.1–1.2)
BUN SERPL-MCNC: 15 MG/DL (ref 6–20)
BUN/CREAT SERPL: 16.3 (ref 7–25)
CALCIUM SERPL-MCNC: 9.3 MG/DL (ref 8.6–10.5)
CHLORIDE SERPL-SCNC: 103 MMOL/L (ref 98–107)
CHOLEST SERPL-MCNC: 173 MG/DL (ref 0–200)
CO2 SERPL-SCNC: 28.1 MMOL/L (ref 22–29)
CREAT SERPL-MCNC: 0.92 MG/DL (ref 0.76–1.27)
EOSINOPHIL # BLD AUTO: 0.07 10*3/MM3 (ref 0–0.7)
EOSINOPHIL NFR BLD AUTO: 2 % (ref 0.3–6.2)
ERYTHROCYTE [DISTWIDTH] IN BLOOD BY AUTOMATED COUNT: 14.3 % (ref 11.5–14.5)
GLOBULIN SER CALC-MCNC: 2.4 GM/DL
GLUCOSE SERPL-MCNC: 81 MG/DL (ref 65–99)
HCT VFR BLD AUTO: 48.7 % (ref 40.4–52.2)
HDLC SERPL-MCNC: 55 MG/DL (ref 40–60)
HGB BLD-MCNC: 15.4 G/DL (ref 13.7–17.6)
IMM GRANULOCYTES # BLD: 0 10*3/MM3 (ref 0–0.03)
IMM GRANULOCYTES NFR BLD: 0 % (ref 0–0.5)
LDLC SERPL CALC-MCNC: 104 MG/DL (ref 0–100)
LDLC/HDLC SERPL: 1.89 {RATIO}
LYMPHOCYTES # BLD AUTO: 0.61 10*3/MM3 (ref 0.9–4.8)
LYMPHOCYTES NFR BLD AUTO: 17.3 % (ref 19.6–45.3)
MCH RBC QN AUTO: 31.2 PG (ref 27–32.7)
MCHC RBC AUTO-ENTMCNC: 31.6 G/DL (ref 32.6–36.4)
MCV RBC AUTO: 98.6 FL (ref 79.8–96.2)
MONOCYTES # BLD AUTO: 0.45 10*3/MM3 (ref 0.2–1.2)
MONOCYTES NFR BLD AUTO: 12.7 % (ref 5–12)
NEUTROPHILS # BLD AUTO: 2.38 10*3/MM3 (ref 1.9–8.1)
NEUTROPHILS NFR BLD AUTO: 67.4 % (ref 42.7–76)
PLATELET # BLD AUTO: 266 10*3/MM3 (ref 140–500)
POTASSIUM SERPL-SCNC: 4.4 MMOL/L (ref 3.5–5.2)
PROT SERPL-MCNC: 6.8 G/DL (ref 6–8.5)
RBC # BLD AUTO: 4.94 10*6/MM3 (ref 4.6–6)
SODIUM SERPL-SCNC: 141 MMOL/L (ref 136–145)
TRIGL SERPL-MCNC: 69 MG/DL (ref 0–150)
TSH SERPL DL<=0.005 MIU/L-ACNC: 2 MIU/ML (ref 0.27–4.2)
VLDLC SERPL CALC-MCNC: 13.8 MG/DL (ref 5–40)
WBC # BLD AUTO: 3.53 10*3/MM3 (ref 4.5–10.7)

## 2018-11-05 ENCOUNTER — OFFICE VISIT (OUTPATIENT)
Dept: INTERNAL MEDICINE | Facility: CLINIC | Age: 58
End: 2018-11-05

## 2018-11-05 VITALS
SYSTOLIC BLOOD PRESSURE: 102 MMHG | BODY MASS INDEX: 25.71 KG/M2 | HEART RATE: 84 BPM | OXYGEN SATURATION: 97 % | TEMPERATURE: 98.6 F | HEIGHT: 66 IN | WEIGHT: 160 LBS | DIASTOLIC BLOOD PRESSURE: 60 MMHG

## 2018-11-05 DIAGNOSIS — M54.32 SCIATICA OF LEFT SIDE: ICD-10-CM

## 2018-11-05 DIAGNOSIS — Z00.00 HEALTH CARE MAINTENANCE: Primary | ICD-10-CM

## 2018-11-05 DIAGNOSIS — B35.1 ONYCHOMYCOSIS: ICD-10-CM

## 2018-11-05 PROCEDURE — 99396 PREV VISIT EST AGE 40-64: CPT | Performed by: INTERNAL MEDICINE

## 2018-11-05 RX ORDER — MELOXICAM 15 MG/1
15 TABLET ORAL DAILY
Qty: 30 TABLET | Refills: 1 | Status: SHIPPED | OUTPATIENT
Start: 2018-11-05 | End: 2019-11-08 | Stop reason: SDUPTHER

## 2018-11-05 RX ORDER — MELOXICAM 7.5 MG/1
7.5 TABLET ORAL DAILY
Qty: 30 TABLET | Refills: 2 | Status: SHIPPED | OUTPATIENT
Start: 2018-11-05 | End: 2018-11-05 | Stop reason: SDUPTHER

## 2018-11-05 RX ORDER — TERBINAFINE HYDROCHLORIDE 250 MG/1
250 TABLET ORAL DAILY
Qty: 30 TABLET | Refills: 5 | Status: SHIPPED | OUTPATIENT
Start: 2018-11-05 | End: 2019-07-01 | Stop reason: SDUPTHER

## 2018-11-05 NOTE — PROGRESS NOTES
"Subjective   Travis Taylor is a 58 y.o. male and is here for a comprehensive physical exam. The patient reports no problems.        Do you take any herbs or supplements that were not prescribed by a doctor? See list      Social History: He does exercise reg  He does eat a healthy diet  Social History     Social History   • Marital status:      Spouse name: JOSE   • Number of children: 2   • Years of education: College     Occupational History   •  [email or web address]     Social History Main Topics   • Smoking status: Never Smoker   • Smokeless tobacco: Never Used   • Alcohol use Yes      Comment: socially   • Drug use: No   • Sexual activity: Not on file     Other Topics Concern   • Not on file     Social History Narrative    2 grown children       Family History:   Family History   Problem Relation Age of Onset   • Hypertension Mother    • Arthritis Mother    • Diabetes Mother    • Stroke Father 85   • Dementia Father    • Depression Father    • Lung cancer Paternal Grandfather         & possible mouth cancer; was a smoker.        Past Medical History:   Past Medical History:   Diagnosis Date   • DDD (degenerative disc disease), lumbar     L4-5   • H/O Broken jaw (CMS/HCC) 1968   • History of concussion 1968   • History of jaundice    • Leukopenia 2018           Review of Systems    A comprehensive review of systems was negative.    Objective   /60 (BP Location: Left arm, Patient Position: Sitting, Cuff Size: Adult)   Pulse 84   Temp 98.6 °F (37 °C) (Oral)   Ht 167.6 cm (66\")   Wt 72.6 kg (160 lb)   SpO2 97%   BMI 25.82 kg/m²     General Appearance:    Alert, cooperative, no distress, appears stated age   Head:    Normocephalic, without obvious abnormality, atraumatic   Eyes:    PERRL, conjunctiva/corneas clear, EOM's intact, fundi     benign, both eyes        Ears:    Normal TM's and external ear canals, both ears   Nose:   Nares normal, septum midline, mucosa normal, no " drainage    or sinus tenderness   Throat:   Lips, mucosa, and tongue normal; teeth and gums normal   Neck:   Supple, symmetrical, trachea midline, no adenopathy;        thyroid:  No enlargement/tenderness/nodules; no carotid    bruit or JVD   Back:     Symmetric, no curvature, ROM normal, no CVA tenderness   Lungs:     Clear to auscultation bilaterally, respirations unlabored   Chest wall:    No tenderness or deformity   Heart:    Regular rate and rhythm, S1 and S2 normal, no murmur, rub   or gallop   Abdomen:     Soft, non-tender, bowel sounds active all four quadrants,     no masses, no organomegaly           Extremities:   Extremities normal, atraumatic, no cyanosis or edema   Pulses:   2+ and symmetric all extremities   Skin:   Skin color, texture, turgor normal, no rashes or lesions   Lymph nodes:   Cervical, supraclavicular, and axillary nodes normal   Neurologic:   CNII-XII intact. Normal strength, sensation and reflexes       throughout       Medications:   Current Outpatient Prescriptions:   •  calcium citrate-vitamin d (CALCIUM CITRATE + D3) 200-250 MG-UNIT tablet tablet, Take  by mouth Daily., Disp: , Rfl:   •  Multiple Vitamin (MULTIVITAMIN) tablet, Take 1 tablet by mouth daily., Disp: , Rfl:   •  Multiple Vitamins-Minerals (ANTIOXIDANT PO), Take 1 tablet by mouth daily., Disp: , Rfl:   •  vitamin B-12 (CYANOCOBALAMIN) 1000 MCG tablet, Take 1,000 mcg by mouth Daily., Disp: , Rfl:   •  vitamin C (ASCORBIC ACID) 500 MG tablet, Take 500 mg by mouth daily., Disp: , Rfl:   •  Zinc 50 MG capsule, Take 1 capsule by mouth daily., Disp: , Rfl:        Assessment/Plan   Healthy male exam.      1. Healthcare Maintenance:  2. Patient Counseling:  --Nutrition: Stressed importance of moderation in sodium/caffeine intake, saturated fat and cholesterol, caloric balance, sufficient intake of fresh fruits, vegetables, fiber, calcium and vit D  --Exercise: he does exercise reg he does eat a healthy diet  --Substance Abuse:  no tob no etoh  --Dental health: he does go to the dentist reg  --Immunizations reviewed.he is UTD with immunizations  --Discussed benefits of screening colonoscopy.  3.  Leukopenia- doing ok  Seeing CBC   4.  Onychomycosis- try lamisil   5.  Left sided sciatica-  He does well with chiro and occas mobic  I have refilled today  Rx lasts for 1 year or 2

## 2018-12-03 ENCOUNTER — CLINICAL SUPPORT (OUTPATIENT)
Dept: INTERNAL MEDICINE | Facility: CLINIC | Age: 58
End: 2018-12-03

## 2018-12-03 DIAGNOSIS — Z23 NEED FOR HEPATITIS A VACCINATION: Primary | ICD-10-CM

## 2018-12-03 PROCEDURE — 90471 IMMUNIZATION ADMIN: CPT | Performed by: INTERNAL MEDICINE

## 2018-12-03 PROCEDURE — 90632 HEPA VACCINE ADULT IM: CPT | Performed by: INTERNAL MEDICINE

## 2018-12-11 ENCOUNTER — TELEPHONE (OUTPATIENT)
Dept: INTERNAL MEDICINE | Facility: CLINIC | Age: 58
End: 2018-12-11

## 2018-12-11 NOTE — TELEPHONE ENCOUNTER
Patient called stating he got a letter from [employer] [facility] that said his terbinafine requires PA. I informed him we never received a letter.    Patient has already picked up Rx. Said he paid $23 for medication.    [facility] and they said we wouldn't have received a PA letter since he paid out of pocket for med. They also said insurances normally do not cover this medication.    I relayed this to patient. I informed him with goodrx coupon he can get med [facility].    He still wants PA done for the next month he has to get medication. He said [employer] [facility] will be sending us some information on this.    We will await paperwork.

## 2018-12-28 ENCOUNTER — TELEPHONE (OUTPATIENT)
Dept: INTERNAL MEDICINE | Facility: CLINIC | Age: 58
End: 2018-12-28

## 2018-12-28 NOTE — TELEPHONE ENCOUNTER
----- Message from Dawna Lima MD sent at 12/28/2018  7:18 AM EST -----  Refer to podiatry   ----- Message -----  From: Jose Delaney  Sent: 12/27/2018   3:26 PM  To: Dawna Lima MD    Patient was prescribed Terbinafine for toenail fungus. Insurance denied this medication because there was no testing done to confirm fungus. Patient wants to know what kind of testing he needs to have done in order for insurance to cover this? Or should he be referred specialist?

## 2018-12-28 NOTE — TELEPHONE ENCOUNTER
Patient states he will pay for out of pocket cost with [employer] coupon which will cost him around $8

## 2019-03-11 ENCOUNTER — LAB (OUTPATIENT)
Dept: OTHER | Facility: HOSPITAL | Age: 59
End: 2019-03-11

## 2019-03-11 DIAGNOSIS — D72.819 LEUKOPENIA, UNSPECIFIED TYPE: ICD-10-CM

## 2019-03-11 DIAGNOSIS — D50.9 IRON DEFICIENCY ANEMIA, UNSPECIFIED IRON DEFICIENCY ANEMIA TYPE: ICD-10-CM

## 2019-03-11 LAB
BASOPHILS # BLD AUTO: 0.02 10*3/MM3 (ref 0–0.2)
BASOPHILS NFR BLD AUTO: 0.6 % (ref 0–1.5)
DEPRECATED RDW RBC AUTO: 46.5 FL (ref 37–54)
EOSINOPHIL # BLD AUTO: 0.08 10*3/MM3 (ref 0–0.4)
EOSINOPHIL NFR BLD AUTO: 2.4 % (ref 0.3–6.2)
ERYTHROCYTE [DISTWIDTH] IN BLOOD BY AUTOMATED COUNT: 13.2 % (ref 12.3–15.4)
FERRITIN SERPL-MCNC: 45 NG/ML (ref 30–400)
HCT VFR BLD AUTO: 45.6 % (ref 37.5–51)
HGB BLD-MCNC: 15.3 G/DL (ref 13–17.7)
IMM GRANULOCYTES # BLD AUTO: 0.01 10*3/MM3 (ref 0–0.05)
IMM GRANULOCYTES NFR BLD AUTO: 0.3 % (ref 0–0.5)
IRON 24H UR-MRATE: 84 MCG/DL (ref 59–158)
IRON SATN MFR SERPL: 27 % (ref 20–50)
LYMPHOCYTES # BLD AUTO: 0.5 10*3/MM3 (ref 0.7–3.1)
LYMPHOCYTES NFR BLD AUTO: 15 % (ref 19.6–45.3)
MCH RBC QN AUTO: 31.7 PG (ref 26.6–33)
MCHC RBC AUTO-ENTMCNC: 33.6 G/DL (ref 31.5–35.7)
MCV RBC AUTO: 94.6 FL (ref 79–97)
MONOCYTES # BLD AUTO: 0.43 10*3/MM3 (ref 0.1–0.9)
MONOCYTES NFR BLD AUTO: 12.9 % (ref 5–12)
NEUTROPHILS # BLD AUTO: 2.3 10*3/MM3 (ref 1.4–7)
NEUTROPHILS NFR BLD AUTO: 68.8 % (ref 42.7–76)
NRBC BLD AUTO-RTO: 0 /100 WBC (ref 0–0)
PLATELET # BLD AUTO: 265 10*3/MM3 (ref 140–450)
PMV BLD AUTO: 8.5 FL (ref 6–12)
RBC # BLD AUTO: 4.82 10*6/MM3 (ref 4.14–5.8)
TIBC SERPL-MCNC: 310 MCG/DL (ref 298–536)
TRANSFERRIN SERPL-MCNC: 208 MG/DL (ref 200–360)
VIT B12 BLD-MCNC: 688 PG/ML (ref 211–946)
WBC NRBC COR # BLD: 3.34 10*3/MM3 (ref 3.4–10.8)

## 2019-03-11 PROCEDURE — 85025 COMPLETE CBC W/AUTO DIFF WBC: CPT | Performed by: INTERNAL MEDICINE

## 2019-03-11 PROCEDURE — 84466 ASSAY OF TRANSFERRIN: CPT | Performed by: INTERNAL MEDICINE

## 2019-03-11 PROCEDURE — 83540 ASSAY OF IRON: CPT | Performed by: INTERNAL MEDICINE

## 2019-03-11 PROCEDURE — 82607 VITAMIN B-12: CPT | Performed by: INTERNAL MEDICINE

## 2019-03-11 PROCEDURE — 82728 ASSAY OF FERRITIN: CPT | Performed by: INTERNAL MEDICINE

## 2019-03-11 PROCEDURE — 36415 COLL VENOUS BLD VENIPUNCTURE: CPT

## 2019-03-14 ENCOUNTER — APPOINTMENT (OUTPATIENT)
Dept: OTHER | Facility: HOSPITAL | Age: 59
End: 2019-03-14

## 2019-03-14 ENCOUNTER — OFFICE VISIT (OUTPATIENT)
Dept: ONCOLOGY | Facility: CLINIC | Age: 59
End: 2019-03-14

## 2019-03-14 VITALS
BODY MASS INDEX: 25.43 KG/M2 | SYSTOLIC BLOOD PRESSURE: 109 MMHG | OXYGEN SATURATION: 99 % | RESPIRATION RATE: 16 BRPM | DIASTOLIC BLOOD PRESSURE: 69 MMHG | TEMPERATURE: 98.1 F | HEART RATE: 87 BPM | HEIGHT: 66 IN | WEIGHT: 158.2 LBS

## 2019-03-14 DIAGNOSIS — D72.819 LEUKOPENIA, UNSPECIFIED TYPE: Primary | ICD-10-CM

## 2019-03-14 DIAGNOSIS — D50.9 IRON DEFICIENCY ANEMIA, UNSPECIFIED IRON DEFICIENCY ANEMIA TYPE: ICD-10-CM

## 2019-03-14 PROCEDURE — 99213 OFFICE O/P EST LOW 20 MIN: CPT | Performed by: INTERNAL MEDICINE

## 2019-03-14 PROCEDURE — G0463 HOSPITAL OUTPT CLINIC VISIT: HCPCS | Performed by: INTERNAL MEDICINE

## 2019-03-14 NOTE — PROGRESS NOTES
.     REASON FOR FOLLOWUP:     1.  Asymptomatic mild leukocytopenia/lymphocytopenia.   2.  Low normal vitamin B12 level, started on oral B12 supplementation in the end of June 2018.   3.  History of iron deficiency anemia secondary to frequent blood donation.  Was on oral iron treatment for 6 month with normalization of hemoglobin.        HISTORY OF PRESENT ILLNESS:  The patient is a 58 y.o. year old male who is here for 6-month re-evaluation of his chronic leukocytopenia and iron deficiency.  He had laboratory studies 3 days ago.     Patient reports that he is at baseline condition with excellent performance status ECOG 0.  Patient reports that he has not to donated blood in the past 9 months.  He is not taking oral iron.  He takes oral vitamin B12 at 1000 µg daily since June 2018.      He does not take prescribed medication. He is on multiple supplementations including calcium, vitamin D, multivitamins, vitamin C and zinc.     He denies recurrent infections, fever, sweating, chills.  He denies recurrent infection.  No weight loss. He has no specific complaints except sciatic nerve, which causes pain in the leg and interferes with his walking at times.      Laboratory study 3 days ago 3/11/2019 reported slightly improved iron studies compared to September 2018, with ferritin 45, free iron 84 TIBC 310 and iron saturation 27%.  His vitamin B12 level was 680, WBC 3340, ANC 2300 and lymphocytes 500 monocytes 430.  His hemoglobin 15.3 and platelets 265,000.    Past Medical History:   Diagnosis Date   • DDD (degenerative disc disease), lumbar     L4-5   • H/O Broken jaw (CMS/HCC) 1968   • History of concussion 1968   • History of jaundice    • Leukopenia 2018     Past Surgical History:   Procedure Laterality Date   • COLONOSCOPY  2012   • HERNIA REPAIR      two serperate surgeries done in 2009 & the other in 2012   • INNER EAR SURGERY Right 1983    Punctured drum       HEMATOLOGIC/ONCOLOGIC HISTORY:   The patient is  a 58 y.o. year old male who was here on 6/28/2018 for Initial evaluation because of his chronic leukocytopenia.       Patient reports that he is doing well with excellent performance status ECOG 0. He denies recurrent infections, fever, sweating, chills. No weight loss. He has no specific complaints except sciatic nerve, which causes pain in the leg and interferes with his walking at times. Otherwise, he is doing well. He does not take prescribed medication. He is on multiple supplementations including calcium, vitamin D, multivitamins, vitamin C and zinc.     This patient had history of iron deficiency in later part of 2017 and was taking oral iron treatment. Apparently that was discontinued in 03/2018. Upon further questioning, patient reports he actually donates blood oftentimes about 4-5 times per year. However, since his diagnosis of iron deficiency, he has not resumed donation yet.     I reviewed all his laboratory study results available in the Epic system. The earliest was from 10/16/2016 when he had normal CMP and there were no CBC results. On 10/30/2017, he had hemoglobin 11.6, MCV 84.9, MCHC 30.3. Platelets were 346,000 and WBC 3200 including neutrophils 2030, lymphocytes 740, monocytes 360. Serum iron was 25, TIBC 455 and iron saturation 5% without ferritin level. On 11/13/2017, he had hemoglobin 11.5, MCV 85.0, MCHC 29.9. Platelets 373,000 and WBC 3640 including neutrophils 2140, lymphocytes 800, monocytes 590. His iron study showed free iron 25, TIBC 477 and iron saturation 5% without ferritin level.  He was taking oral iron at that time.  Laboratory study on 03/16/2018 reported normalized hemoglobin 15.3, MCV 93.3, MCHC 32.1. Platelets were 264,000 and WBC 3390 including neutrophils 2020, lymphocytes 710 and monocytes 560. Free iron was 75 without iron saturation.  Oral iron treatment was discontinued then.    Laboratory study on 05/25/2018 reported hemoglobin 14.2, MCV 99.3, MCHC 31.6. Platelets  255,000 and WBC 2910 including neutrophils 1800 and lymphocytes 680, monocytes 340. Serum free iron was 96. Folic acid more than 20 ng/mL and vitamin B12 at 464 pg/mL. There was no ferritin level nor iron saturation.      Laboratory study on 6/28/2018 reported WBC 3440, including neutrophils 2340, lymphocytes 570 and monocytes 390.  Peripheral blood smear reviewed.  There is no apparent abnormalities in morphologis of WBC population.  Also normal RBC and the platelets.  No evidence of hematology malignancy.    Patient was started on oral vitamin B12 at 1000 µg daily at the end of June 2018.     Patient reports that he is doing well with excellent performance status ECOG 0.  He takes oral vitamin B12 (OTC) at 1000 µg daily for the past 3 months as instructed.  Patient reports he noticed no benefits from oral vitamin B12, normal side effects.  He does not take oral iron. He does not take prescribed medication. He is on multiple supplementations including calcium, vitamin D, multivitamins, vitamin C and zinc.     He denies recurrent infections, fever, sweating, chills. No weight loss. He has no specific complaints except sciatic nerve, which causes pain in the leg and interferes with his walking at times.      Laboratory study on 9/24/2018 reported WBC 3560 including neutrophils 2350, lymphocytes 690 and monocytes 430.  Platelets 255,000.  None of those numbers change significantly compared to those 3 months ago.  His hemoglobin further improved at a 16.4, with hematocrit 49.0% and MCV 94.8 MCHC 33.5.  His B12 level improved at 719 pg/mL.  His serum ferritin 30.4 ng/mm, free iron 120 TIBC 356 and iron saturation 34%.       MEDICATIONS    Current Outpatient Medications:   •  calcium citrate-vitamin d (CALCIUM CITRATE + D3) 200-250 MG-UNIT tablet tablet, Take  by mouth Daily., Disp: , Rfl:   •  meloxicam (MOBIC) 15 MG tablet, Take 1 tablet by mouth Daily., Disp: 30 tablet, Rfl: 1  •  Multiple Vitamin (MULTIVITAMIN)  tablet, Take 1 tablet by mouth daily., Disp: , Rfl:   •  Multiple Vitamins-Minerals (ANTIOXIDANT PO), Take 1 tablet by mouth daily., Disp: , Rfl:   •  terbinafine (LAMISIL) 250 MG tablet, Take 1 tablet by mouth Daily., Disp: 30 tablet, Rfl: 5  •  vitamin B-12 (CYANOCOBALAMIN) 1000 MCG tablet, Take 1,000 mcg by mouth Daily., Disp: , Rfl:   •  vitamin C (ASCORBIC ACID) 500 MG tablet, Take 500 mg by mouth daily., Disp: , Rfl:   •  Zinc 50 MG capsule, Take 1 capsule by mouth daily., Disp: , Rfl:     ALLERGIES:     Allergies   Allergen Reactions   • Penicillins Other (See Comments)       SOCIAL HISTORY:       Social History     Social History   • Marital status:      Spouse name: JOSE   • Number of children: 2 grown children   • Years of education:   College education     Occupational History   • 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     Social History Main Topics   • Smoking status: Never Smoker   • Smokeless tobacco: Never Used   • Alcohol use Yes      Comment: socially   3 drinks per week.   • Drug use: No   • Sexual activity: Not on file         FAMILY HISTORY:  Family History   Problem Relation Age of Onset   • Hypertension Mother    • Arthritis Mother    • Diabetes Mother    • Stroke Father 85   • Dementia Father    • Depression Father    • Lung cancer Paternal Grandfather         & possible mouth cancer; was a smoker.    Mother has hypertension and diabetes, and gallbladder stone, alive in her mid 80s in 2018.   Uncle has heart disease.      REVIEW OF SYSTEMS:  Review of Systems   Constitutional: Negative for appetite change, chills, fatigue, fever and unexpected weight change.   HENT: Negative for congestion, mouth sores, rhinorrhea, sinus pressure, sore throat and trouble swallowing.    Eyes: Negative for visual disturbance.   Respiratory: Negative for cough and shortness of breath.    Cardiovascular: Negative for chest pain, palpitations and leg swelling.   Gastrointestinal: Negative  "for abdominal pain, blood in stool, constipation, diarrhea, nausea and vomiting.   Endocrine: Negative for cold intolerance.   Genitourinary: Negative for dysuria and hematuria.   Musculoskeletal: Positive for back pain. Negative for joint swelling.        Has sciatic nerve, and pain in the leg   Skin: Negative for color change.   Allergic/Immunologic: Negative for food allergies.   Neurological: Negative for numbness and headaches.   Hematological: Negative for adenopathy. Does not bruise/bleed easily.   Psychiatric/Behavioral: Negative for confusion.              Vitals:    03/14/19 0859   BP: 109/69   Pulse: 87   Resp: 16   Temp: 98.1 °F (36.7 °C)   SpO2: 99%   Weight: 71.8 kg (158 lb 3.2 oz)   Height: 168 cm (66.14\")   PainSc: 0-No pain     Current Status 3/14/2019   ECOG score 0      PHYSICAL EXAM:      CONSTITUTIONAL:   Well-developed well-nourished male.  Healthy looking.    EYES:  Conjunctiva and lids unremarkable.   EARS,NOSE,MOUTH,THROAT:  Nose appear unremarkable.  Lips appear unremarkable.   Oral mucosa moist.  RESPIRATORY:  Normal respiratory effort.  Lungs clear to auscultation bilaterally.  CARDIOVASCULAR:  Regular rhythm and normal rate, no murmurs.  Normal S1, S2.    GASTROINTESTINAL: Abdomen no tender.  No hepatomegaly.  No splenomegaly.   Bowel sounds normal.  LYMPHATIC:  No cervical lymphadenopathy.  MUSCULOSKELETAL:   No cyanosis or clubbing. No lower extremity edema.   SKIN:  Warm.  No rashes.  PSYCHIATRIC:  Normal judgment and insight.  Normal mood and affect.      RECENT LABS:      Lab Results   Component Value Date    WBC 3.34 (L) 03/11/2019    HGB 15.3 03/11/2019    HCT 45.6 03/11/2019    MCV 94.6 03/11/2019     03/11/2019     Lab Results   Component Value Date    NEUTROABS 2.30 03/11/2019     Lab Results   Component Value Date    YIWBDIUT39 688 03/11/2019     Lab Results   Component Value Date    IRON 84 03/11/2019    TIBC 310 03/11/2019    FERRITIN 45.00 03/11/2019     Iron " saturation 27%      Assessment/Plan   1.  Leukopenia, mostly with lymphocytopenia and his neutrophil has been low normal for the most time. He is asymptomatic.  Denies recurrent infection.      2.  Low normal vitamin B12 level.  Laboratory study in May 2018 showed low normal vitamin B12 level together with macrocytosis, suspect that he might have functional vitamin B12 deficiency.  Patient was started oral vitamin B12 at 1000 µg daily in June 2018 and has improved B12 level around 700 pg/mL in September 2018 and March 2019.    Because he has no side effects from oral vitamin B12, I recommended him to continue take this.    3.   History of Iron deficiency anemia.   That was secondary to his frequent donation of blood.    His anemia responded to oral iron treatment very nicely, and he stoped oral iron treatment after 6 months.      Patient reports he did not a donated blood in the past 9 m0nths.      His laboratory studies on 9/24/2018 showed significantly improved iron saturation at 34%, but still has relatively low ferritin level at 30.4 ng/mL.      Most recently on 3/11/2019, he had a further improved a ferritin level at 45 ng/mL which is still relatively low.  His saturations normal.     I discussed with patient today that he should not donate RBC all whole blood in the future because with his iron study currently, he will become iron deficient again after one pint RBC donation.  Patient voiced understanding.  He did inquire whether he could donate platelets.  I think that is reasonable, typically does not cause iron deficiency.      PLAN:  1.   Continue oral Vitamin B12 at 1000 µg daily.  2.   We would not arrange patient for follow-up with us regularly.  Patient will follow-up with his primary care physician Dr. Lima.  If needed in the future, be happy to see him again.        Discussed with patient and he voiced understanding.       DANNIE RICHARDSON M.D., Ph.D.    3/14/2019    CC:   Dawna Lima MD     Dictated  using Dragon Dictation.

## 2019-05-23 ENCOUNTER — TELEPHONE (OUTPATIENT)
Dept: ONCOLOGY | Facility: HOSPITAL | Age: 59
End: 2019-05-23

## 2019-05-23 NOTE — TELEPHONE ENCOUNTER
----- Message from Pema Jacobs sent at 5/23/2019  2:33 PM EDT -----  Contact: [phone]  Pt asking if it is ok for him to donate platelets?      Message sent to Dr. Bang. Await response    Beni Bang MD PhD  Margo Cadena RN             Yes, no problem.     Thanks!      Informed pt

## 2019-07-01 ENCOUNTER — TELEPHONE (OUTPATIENT)
Dept: INTERNAL MEDICINE | Facility: CLINIC | Age: 59
End: 2019-07-01

## 2019-07-01 RX ORDER — TERBINAFINE HYDROCHLORIDE 250 MG/1
250 TABLET ORAL DAILY
Qty: 30 TABLET | Refills: 5 | Status: SHIPPED | OUTPATIENT
Start: 2019-07-01 | End: 2020-04-24

## 2019-07-01 NOTE — TELEPHONE ENCOUNTER
RX SENT TO PHARMACY    ----- Message from Leila Urias Rep sent at 6/28/2019  3:19 PM EDT -----  Pt is requesting a refill on   terbinafine (LAMISIL) 250 MG tablet    Juan Roger:

## 2019-10-29 DIAGNOSIS — B35.1 ONYCHOMYCOSIS: ICD-10-CM

## 2019-10-29 DIAGNOSIS — Z00.00 HEALTH CARE MAINTENANCE: ICD-10-CM

## 2019-10-29 DIAGNOSIS — M54.32 SCIATICA OF LEFT SIDE: ICD-10-CM

## 2019-10-31 LAB
ALBUMIN SERPL-MCNC: 4.5 G/DL (ref 3.5–5.2)
ALBUMIN/GLOB SERPL: 2.3 G/DL
ALP SERPL-CCNC: 61 U/L (ref 39–117)
ALT SERPL-CCNC: 22 U/L (ref 1–41)
AST SERPL-CCNC: 15 U/L (ref 1–40)
BASOPHILS # BLD AUTO: 0.02 10*3/MM3 (ref 0–0.2)
BASOPHILS NFR BLD AUTO: 0.7 % (ref 0–1.5)
BILIRUB SERPL-MCNC: 0.5 MG/DL (ref 0.2–1.2)
BUN SERPL-MCNC: 14 MG/DL (ref 6–20)
BUN/CREAT SERPL: 15.7 (ref 7–25)
CALCIUM SERPL-MCNC: 8.9 MG/DL (ref 8.6–10.5)
CHLORIDE SERPL-SCNC: 102 MMOL/L (ref 98–107)
CHOLEST SERPL-MCNC: 183 MG/DL (ref 0–200)
CO2 SERPL-SCNC: 28.8 MMOL/L (ref 22–29)
CREAT SERPL-MCNC: 0.89 MG/DL (ref 0.76–1.27)
EOSINOPHIL # BLD AUTO: 0.06 10*3/MM3 (ref 0–0.4)
EOSINOPHIL NFR BLD AUTO: 2.1 % (ref 0.3–6.2)
ERYTHROCYTE [DISTWIDTH] IN BLOOD BY AUTOMATED COUNT: 12.6 % (ref 12.3–15.4)
GLOBULIN SER CALC-MCNC: 2 GM/DL
GLUCOSE SERPL-MCNC: 82 MG/DL (ref 65–99)
HCT VFR BLD AUTO: 46 % (ref 37.5–51)
HDLC SERPL-MCNC: 51 MG/DL (ref 40–60)
HGB BLD-MCNC: 15.4 G/DL (ref 13–17.7)
IMM GRANULOCYTES # BLD AUTO: 0.01 10*3/MM3 (ref 0–0.05)
IMM GRANULOCYTES NFR BLD AUTO: 0.4 % (ref 0–0.5)
LDLC SERPL CALC-MCNC: 116 MG/DL (ref 0–100)
LDLC/HDLC SERPL: 2.27 {RATIO}
LYMPHOCYTES # BLD AUTO: 0.56 10*3/MM3 (ref 0.7–3.1)
LYMPHOCYTES NFR BLD AUTO: 19.7 % (ref 19.6–45.3)
MCH RBC QN AUTO: 31.2 PG (ref 26.6–33)
MCHC RBC AUTO-ENTMCNC: 33.5 G/DL (ref 31.5–35.7)
MCV RBC AUTO: 93.1 FL (ref 79–97)
MONOCYTES # BLD AUTO: 0.34 10*3/MM3 (ref 0.1–0.9)
MONOCYTES NFR BLD AUTO: 12 % (ref 5–12)
NEUTROPHILS # BLD AUTO: 1.85 10*3/MM3 (ref 1.7–7)
NEUTROPHILS NFR BLD AUTO: 65.1 % (ref 42.7–76)
NRBC BLD AUTO-RTO: 0 /100 WBC (ref 0–0.2)
PLATELET # BLD AUTO: 282 10*3/MM3 (ref 140–450)
POTASSIUM SERPL-SCNC: 4.5 MMOL/L (ref 3.5–5.2)
PROT SERPL-MCNC: 6.5 G/DL (ref 6–8.5)
RBC # BLD AUTO: 4.94 10*6/MM3 (ref 4.14–5.8)
SODIUM SERPL-SCNC: 141 MMOL/L (ref 136–145)
TRIGL SERPL-MCNC: 80 MG/DL (ref 0–150)
TSH SERPL DL<=0.005 MIU/L-ACNC: 2.18 UIU/ML (ref 0.27–4.2)
VIT B12 SERPL-MCNC: 832 PG/ML (ref 211–946)
VLDLC SERPL CALC-MCNC: 16 MG/DL
WBC # BLD AUTO: 2.84 10*3/MM3 (ref 3.4–10.8)

## 2019-11-06 ENCOUNTER — OFFICE VISIT (OUTPATIENT)
Dept: INTERNAL MEDICINE | Facility: CLINIC | Age: 59
End: 2019-11-06

## 2019-11-06 VITALS
WEIGHT: 155.4 LBS | TEMPERATURE: 98.8 F | HEART RATE: 80 BPM | SYSTOLIC BLOOD PRESSURE: 108 MMHG | HEIGHT: 66 IN | DIASTOLIC BLOOD PRESSURE: 70 MMHG | BODY MASS INDEX: 24.98 KG/M2 | OXYGEN SATURATION: 96 %

## 2019-11-06 DIAGNOSIS — M54.32 LEFT SIDED SCIATICA: ICD-10-CM

## 2019-11-06 DIAGNOSIS — B35.1 ONYCHOMYCOSIS: ICD-10-CM

## 2019-11-06 DIAGNOSIS — Z00.00 HEALTH CARE MAINTENANCE: Primary | ICD-10-CM

## 2019-11-06 DIAGNOSIS — D72.819 LEUKOPENIA, UNSPECIFIED TYPE: ICD-10-CM

## 2019-11-06 PROCEDURE — 99396 PREV VISIT EST AGE 40-64: CPT | Performed by: INTERNAL MEDICINE

## 2019-11-06 NOTE — PROGRESS NOTES
"Subjective   Travis Taylor is a 59 y.o. male and is here for a comprehensive physical exam. The patient reports problems - sciatica.    HE does have some sciatica on the left  He has trouble on and off he has seen the chiro and it does help some  HE has had some left leg pain after using the elliptical  And has been seeing chiro weekly  He has had some relief with mobic      Do you take any herbs or supplements that were not prescribed by a doctor? mvi b12 c calcium and d      Social History: he does eat a healthy diet and exercises reg    Social History     Socioeconomic History   • Marital status:      Spouse name: JOSE   • Number of children: 2   • Years of education: College   • Highest education level: Not on file   Occupational History   • Occupation:      Employer: [employer] [place]   Tobacco Use   • Smoking status: Never Smoker   • Smokeless tobacco: Never Used   Substance and Sexual Activity   • Alcohol use: Yes     Comment: socially   • Drug use: No   Social History Narrative    2 grown children       Family History:   Family History   Problem Relation Age of Onset   • Hypertension Mother    • Arthritis Mother    • Diabetes Mother    • Stroke Father 85   • Dementia Father    • Depression Father    • Lung cancer Paternal Grandfather         & possible mouth cancer; was a smoker.        Past Medical History:   Past Medical History:   Diagnosis Date   • DDD (degenerative disc disease), lumbar     L4-5   • H/O Broken jaw (CMS/HCC) 1968   • History of concussion 1968   • History of jaundice    • Leukopenia 2018           Review of Systems    A comprehensive review of systems was negative.    Objective   /70 (BP Location: Left arm, Patient Position: Sitting)   Pulse 80   Temp 98.8 °F (37.1 °C) (Oral)   Ht 168 cm (66.14\")   Wt 70.5 kg (155 lb 6.4 oz)   SpO2 96%   BMI 24.98 kg/m²     General Appearance:    Alert, cooperative, no distress, appears stated age   Head:    " Normocephalic, without obvious abnormality, atraumatic   Eyes:    PERRL, conjunctiva/corneas clear, EOM's intact, fundi     benign, both eyes        Ears:    Normal TM's and external ear canals, both ears   Nose:   Nares normal, septum midline, mucosa normal, no drainage    or sinus tenderness   Throat:   Lips, mucosa, and tongue normal; teeth and gums normal   Neck:   Supple, symmetrical, trachea midline, no adenopathy;        thyroid:  No enlargement/tenderness/nodules; no carotid    bruit or JVD   Back:     Symmetric, no curvature, ROM normal, no CVA tenderness   Lungs:     Clear to auscultation bilaterally, respirations unlabored   Chest wall:    No tenderness or deformity   Heart:    Regular rate and rhythm, S1 and S2 normal, no murmur, rub   or gallop   Abdomen:     Soft, non-tender, bowel sounds active all four quadrants,     no masses, no organomegaly   Genitalia:    Normal male without lesion, discharge or tenderness   Rectal:    Normal tone, normal prostate, no masses or tenderness;    guaiac negative stool   Extremities:   Extremities normal, atraumatic, no cyanosis or edema   Pulses:   2+ and symmetric all extremities   Skin:   Skin color, texture, turgor normal, no rashes or lesions   Lymph nodes:   Cervical, supraclavicular, and axillary nodes normal   Neurologic:   CNII-XII intact. Normal strength, sensation and reflexes       throughout       Medications:   Current Outpatient Medications:   •  calcium citrate-vitamin d (CALCIUM CITRATE + D3) 200-250 MG-UNIT tablet tablet, Take  by mouth Daily., Disp: , Rfl:   •  meloxicam (MOBIC) 15 MG tablet, Take 1 tablet by mouth Daily., Disp: 30 tablet, Rfl: 1  •  Multiple Vitamin (MULTIVITAMIN) tablet, Take 1 tablet by mouth daily., Disp: , Rfl:   •  Multiple Vitamins-Minerals (ANTIOXIDANT PO), Take 1 tablet by mouth daily., Disp: , Rfl:   •  terbinafine (LAMISIL) 250 MG tablet, Take 1 tablet by mouth Daily., Disp: 30 tablet, Rfl: 5  •  vitamin B-12  (CYANOCOBALAMIN) 1000 MCG tablet, Take 1,000 mcg by mouth Daily., Disp: , Rfl:   •  vitamin C (ASCORBIC ACID) 500 MG tablet, Take 500 mg by mouth daily., Disp: , Rfl:   •  Zinc 50 MG capsule, Take 1 capsule by mouth daily., Disp: , Rfl:     The 10-year ASCVD risk score (Brenda NORTON Jr., et al., 2013) is: 4.9%    Values used to calculate the score:      Age: 59 years      Sex: Male      Is Non- : No      Diabetic: No      Tobacco smoker: No      Systolic Blood Pressure: 108 mmHg      Is BP treated: No      HDL Cholesterol: 51 mg/dL      Total Cholesterol: 183 mg/dL       Assessment/Plan   Healthy male exam.      1. Healthcare Maintenance:  2. Patient Counseling:  --Nutrition: Stressed importance of moderation in sodium/caffeine intake, saturated fat and cholesterol, caloric balance, sufficient intake of fresh fruits, vegetables, fiber, calcium and vit D  --Exercise: he does exercise reg  --Substance Abuse: no tob occas etoh  --Dental health: he does go to the dentist reg  --Immunizations reviewed.  --Discussed benefits of screening colonoscopy.  3.  Left sided sciatica- refer to PT   4,  Onychomycosis-  On for a year and not gone  Toe nail grow slowly but I would refer podiatry   5.  Leukopenia-  Sees cbc  No sx stable  Though a little lower

## 2019-11-06 NOTE — PATIENT INSTRUCTIONS
Medicare Wellness  Personal Prevention Plan of Service     Date of Office Visit:  2019  Encounter Provider:  Dawna Lima MD  Place of Service:  [facility]  Patient Name: Travis Taylor  :  1960    As part of the Medicare Wellness portion of your visit today, we are providing you with this personalized preventive plan of services (PPPS). This plan is based upon recommendations of the [place] Preventive Services Task Force (USPSTF) and the Advisory Committee on Immunization Practices (ACIP).    This lists the preventive care services that should be considered, and provides dates of when you are due. Items listed as completed are up-to-date and do not require any further intervention.    Health Maintenance   Topic Date Due   • ZOSTER VACCINE (2 of 3) 2016   • ANNUAL PHYSICAL  2019   • COLONOSCOPY  2025   • TDAP/TD VACCINES (2 - Td) 10/06/2025   • HEPATITIS C SCREENING  Completed   • INFLUENZA VACCINE  Completed       Orders Placed This Encounter   Procedures   • Ambulatory Referral to Physical Therapy Evaluate and treat     Referral Priority:   Routine     Referral Type:   Therapy     Referral Reason:   Specialty Services Required     Requested Specialty:   Physical Therapy     Number of Visits Requested:   1       No Follow-up on file.

## 2019-11-08 RX ORDER — MELOXICAM 15 MG/1
TABLET ORAL
Qty: 30 TABLET | Refills: 2 | Status: SHIPPED | OUTPATIENT
Start: 2019-11-08 | End: 2020-11-10

## 2019-11-21 ENCOUNTER — TREATMENT (OUTPATIENT)
Dept: PHYSICAL THERAPY | Facility: CLINIC | Age: 59
End: 2019-11-21

## 2019-11-21 DIAGNOSIS — M54.16 RADICULOPATHY, LUMBAR REGION: Primary | ICD-10-CM

## 2019-11-21 DIAGNOSIS — R26.9 IMPAIRED GAIT: ICD-10-CM

## 2019-11-21 DIAGNOSIS — M79.605 PAIN OF LEFT LOWER EXTREMITY: ICD-10-CM

## 2019-11-21 PROCEDURE — 97110 THERAPEUTIC EXERCISES: CPT | Performed by: PHYSICAL THERAPIST

## 2019-11-21 PROCEDURE — 97161 PT EVAL LOW COMPLEX 20 MIN: CPT | Performed by: PHYSICAL THERAPIST

## 2019-11-21 NOTE — PROGRESS NOTES
Physical Therapy Initial Evaluation and Plan of Care    Patient: Travis Taylor   : 1960  Diagnosis/ICD-10 Code:  Radiculopathy, lumbar region [M54.16]  Referring practitioner: Dawna Lima MD  Date of Initial Visit: 2019  Today's Date: 2019  Patient seen for 1 sessions           Subjective Questionnaire: Oswestry: 32/80      Subjective Evaluation    History of Present Illness  Mechanism of injury: Pt reports pain in L anterior thigh that radiates to lateral lower leg. Hx of leg pain in  that was disrupting sleep. Diagnostics revealed bulging lumbar disc. Treated w/ oral anti-inflammatories for 6 months. Stopped due to rectal bleeding. Started seeing chiropractor late . Goes as needed (every 2-3 weeks). Pain increased with decreased frequency at chiropractor. L LE flexibility is limited. Pain increased with yard work. Relieved with ibuprofen or meloxicam. Hadn't been to gym in 6-8 mos. Nov 1 did 12 mins on elliptical and used weight machines (including ab flexion machine). Pain significantly increased.  Has been wearing SI belt for 6 months with some relief.  Denies burnign, tingling, numbness  States he was told by chiro [facility], has been wearing lift for 5 years    MRI 13:  L4-L5: Central disc protrusion is noted with annular fissure. This flattens the thecal sac causing mild spinal stenosis. This abuts the L5 nerve root in the left lateral recess without compressing it. Facet arthropathy is also identified and there is     mild bilateral foraminal stenosis.        L5-S1: Central disc protrusion flattens the thecal sac causing mild stenosis. Mild facet arthropathy on the left results in mild left foraminal narrowing.    Impression:  1. Overall mild degenerative spondylosis of the lumbar spine. Central disc protrusion with annular fissure at L4-L5 results in mild spinal stenosis and lateral recess narrowing.  2. Smaller central disc protrusion is noted at the L5-S1  level.      Patient Occupation: N/A (ADLs include housework, yardwork, bike, hike, gym) Pain  Current pain ratin  Location: L upper thigh (ant,lat/post), L lateral lower leg/calf  Quality: dull ache and sharp  Relieving factors: medications and rest  Aggravating factors: ambulation, lifting, movement, standing and prolonged positioning (Pain upon standing from prolonged sitting)  Progression: worsening (Lower tolerance to exercise)    Social Support  Lives with: spouse    Treatments  Previous treatment: chiropractic  Current treatment: physical therapy  Patient Goals  Patient goals for therapy: decreased pain             Objective     Special Questions  Patient is experiencing night pain.     Additional Special Questions  Pain laying on side, pillow between knees  Gait: antalgic      Postural Observations  Seated posture: good        Active Range of Motion     Lumbar   Left rotation: Active left lumbar rotation: Mild pain, lateral lower leg. with pain  Right rotation: Active right lumbar rotation: Mild pain, lateral lower leg. with pain    Additional Active Range of Motion Details  WNL    Strength/Myotome Testing     Lumbar   Left   Normal strength    Right   Normal strength    Tests       Thoracic   Positive slump.     Additional Tests Details  + Slump R anterior thigh pain    General Comments     Spine Comments  Mild limitation hamstring flexibility B  Quadriceps WNL, reproduced anterior thigh discomfort L         Assessment & Plan     Assessment  Impairments: abnormal gait, abnormal or restricted ROM, activity intolerance, lacks appropriate home exercise program and pain with function  Assessment details: Pt presents w/ L LE pain consistent w/ lumbar radiculopathy. Lumbar AROM WNL. Pt has L LE pain w/ repetitive lumbar flexion, positive Slump, and limited L LE flexibility. Will benefit from skilled PT services in order to address listed impairments and increase tolerance to normal daily activities including  ADLs and recreational activities.    Prognosis: good  Functional Limitations: carrying objects, lifting, sleeping, walking and uncomfortable because of pain  Goals  Plan Goals: Short Term Goals: 4-6 weeks. Patient will:  1. Be independent with initial HEP  2. Be instructed in posture and body mechanics  3. Demonstrate TrA contraction during exercises in clinic without need for cueing.  4. Report pain of </= 4/10 with daily activities    Long Term Goals: 6-8 weeks. Patient will:  1. Demonstrate lower extremity flexibility and lumbar ROM WFL to allow for return to household & recreational activities w/o increased symptoms  2. Report pain of </= 1/10 with all daily activities.  3. Perceived disability </= 10% as measured by Oswestry Questionnaire.  4. Be independent with long term HEP    Plan  Therapy options: will be seen for skilled physical therapy services  Planned modality interventions: cryotherapy, electrical stimulation/Russian stimulation, thermotherapy (hydrocollator packs), traction and ultrasound  Planned therapy interventions: abdominal trunk stabilization, ADL retraining, body mechanics training, balance/weight-bearing training, flexibility, functional ROM exercises, gait training, home exercise program, joint mobilization, manual therapy, neuromuscular re-education, soft tissue mobilization, spinal/joint mobilization, strengthening, stretching and therapeutic activities  Frequency: 2x week  Duration in weeks: 12  Treatment plan discussed with: patient        Manual Therapy:    0     mins  68396;  Therapeutic Exercise:    25     mins  28196;     Neuromuscular Deanna:    0    mins  99896;    Therapeutic Activity:     0     mins  93315;     Gait Trainin     mins  38218;     Ultrasound:     0     mins  75456;    Electrical Stimulation:    0     mins  42949 ( );  Dry Needling     0     mins self-pay    Timed Treatment:   25   mins   Total Treatment:     60   mins    PT SIGNATURE: Vira Rachel  PT   DATE TREATMENT INITIATED: 11/21/2019    Initial Certification  Certification Period: 2/19/2020  I certify that the therapy services are furnished while this patient is under my care.  The services outlined above are required by this patient, and will be reviewed every 90 days.     PHYSICIAN: Dawna Lima MD      DATE:     Please sign and return via fax to [phone].. Thank you, [facility].

## 2019-12-16 ENCOUNTER — TREATMENT (OUTPATIENT)
Dept: PHYSICAL THERAPY | Facility: CLINIC | Age: 59
End: 2019-12-16

## 2019-12-16 DIAGNOSIS — M79.605 PAIN OF LEFT LOWER EXTREMITY: ICD-10-CM

## 2019-12-16 DIAGNOSIS — M54.16 RADICULOPATHY, LUMBAR REGION: Primary | ICD-10-CM

## 2019-12-16 DIAGNOSIS — R26.9 IMPAIRED GAIT: ICD-10-CM

## 2019-12-16 PROCEDURE — 97110 THERAPEUTIC EXERCISES: CPT | Performed by: PHYSICAL THERAPIST

## 2019-12-16 PROCEDURE — 97112 NEUROMUSCULAR REEDUCATION: CPT | Performed by: PHYSICAL THERAPIST

## 2019-12-16 NOTE — PROGRESS NOTES
Physical Therapy Daily Progress Note      Visit # 2      Subjective   Pt reports shooting pain in L leg after flying to [place], not localized to anterior or posterior leg. Felt pretty good once he started doing home exercise program. Flew back this past Friday/Saturday. [place] stiff .   L hip is still very tight. Has difficulty crossing L foot over R thigh.    Objective   See Exercise, Manual, and Modality Logs for complete treatment.       Assessment/Plan  Pt continues to have asymetric gait, limited L hip ER mobility, and difficulty activating transverse abdominis to perform pelvic tilts. Required extensive verbal and tactile cuing for pelvic tilt/neutral spine. Updated HEP to include hamstring stretch and pelvic tilting in quadruped. Progress per POC.                 Manual Therapy:    0     mins  08695;  Therapeutic Exercise:    25     mins  61235;     Neuromuscular Deanna:    15    mins  34117;    Therapeutic Activity:     0     mins  08914;     Gait Trainin     mins  36087;     Ultrasound:     0     mins  69898;    Work Hardening           00      mins 61880  Iontophoresis               0   mins 26911    Timed Treatment:   40   mins   Total Treatment:     52   mins    Vira Rachel, PT  Physical Therapist

## 2019-12-23 ENCOUNTER — TREATMENT (OUTPATIENT)
Dept: PHYSICAL THERAPY | Facility: CLINIC | Age: 59
End: 2019-12-23

## 2019-12-23 DIAGNOSIS — M79.605 PAIN OF LEFT LOWER EXTREMITY: ICD-10-CM

## 2019-12-23 DIAGNOSIS — R26.9 IMPAIRED GAIT: ICD-10-CM

## 2019-12-23 DIAGNOSIS — M54.16 RADICULOPATHY, LUMBAR REGION: Primary | ICD-10-CM

## 2019-12-23 PROCEDURE — 97110 THERAPEUTIC EXERCISES: CPT | Performed by: PHYSICAL THERAPIST

## 2019-12-23 PROCEDURE — 97140 MANUAL THERAPY 1/> REGIONS: CPT | Performed by: PHYSICAL THERAPIST

## 2019-12-23 NOTE — PROGRESS NOTES
Physical Therapy Daily Progress Note      Visit # 3      Subjective   Pt reports less pain after last PT visit. Previously, he was having pain in both upper and lower leg. Now, pain is localized to upper anterior thigh.    Objective  See Exercise, Manual, and Modality Logs for complete treatment.       Assessment/Plan  Continues to have asymmetric gait w/ tendency to circumduct L LE. L hip flexibility slowly improving w/ hip mobilization and stretching. Pt demonstrated improved core activation and control of neutral spine. No significant decrease in pain w/ manual lumbar traction. May benefit from mechanical lumbar traction.   Updated HEP to include brace + march. Progress per POC.             Manual Therapy:    15     mins  19776;  Therapeutic Exercise:    25     mins  81444;     Neuromuscular Deanna:    0    mins  59336;    Therapeutic Activity:     0     mins  28348;     Gait Trainin     mins  65532;     Ultrasound:     0     mins  26972;    Work Hardening           0      mins 31306  Iontophoresis               0   mins 59494    Timed Treatment:   40   mins   Total Treatment:     45   mins    Vira Rachel, PT  Physical Therapist

## 2020-01-02 ENCOUNTER — TREATMENT (OUTPATIENT)
Dept: PHYSICAL THERAPY | Facility: CLINIC | Age: 60
End: 2020-01-02

## 2020-01-02 DIAGNOSIS — R26.9 IMPAIRED GAIT: ICD-10-CM

## 2020-01-02 DIAGNOSIS — M54.16 RADICULOPATHY, LUMBAR REGION: Primary | ICD-10-CM

## 2020-01-02 DIAGNOSIS — M79.605 PAIN OF LEFT LOWER EXTREMITY: ICD-10-CM

## 2020-01-02 PROCEDURE — 97140 MANUAL THERAPY 1/> REGIONS: CPT | Performed by: PHYSICAL THERAPIST

## 2020-01-02 PROCEDURE — 97110 THERAPEUTIC EXERCISES: CPT | Performed by: PHYSICAL THERAPIST

## 2020-01-02 NOTE — PROGRESS NOTES
Physical Therapy Daily Progress Note      Visit # 4      Subjective   Pt reports increased pain in L anterolateral thigh after last PT session. Pain decreased after a few days but increased again with yard work last Friday. He was unable to perform prone hip extension due to weakness. Ab brace w/  caused pain in thigh.    Objective   Gait: antalgic, circumducts/hikes R LE, vaults on L  Patrick Test: R hip flexors most limited, L hip WNL  ASIS level  R leg length short    See Exercise, Manual, and Modality Logs for complete treatment.       Assessment/Plan  Psoas equally tender. R hip flexor length significantly limited.  Pt reported increased L thigh pain w/ unilateral lumbar traction. No improvement in thigh pain w/ bilateral lumbar traction or lumbar gapping.   Mild decrease in pain overall with R hip flexor stretching and L hip joint mobilization.   Continues to have c/o L anterolateral thigh pain, significantly limited L hip flexion/ER/IR, and antalgic gait.  Recommend pt be seen by ortho/sports med for further assessment/diagnostic imaging. Unable to rule out L hip arthropathy.            Manual Therapy:    25     mins  51966;  Therapeutic Exercise:    20     mins  73457;     Neuromuscular Deanna:    0    mins  67122;    Therapeutic Activity:     0     mins  26287;     Gait Trainin     mins  70007;     Ultrasound:     0     mins  28934;    Work Hardening           0      mins 16778  Iontophoresis               0   mins 26364    Timed Treatment:   45   mins   Total Treatment:     60   mins    Vira Rachel, PT  Physical Therapist

## 2020-01-06 ENCOUNTER — TREATMENT (OUTPATIENT)
Dept: PHYSICAL THERAPY | Facility: CLINIC | Age: 60
End: 2020-01-06

## 2020-01-06 DIAGNOSIS — M54.16 RADICULOPATHY, LUMBAR REGION: Primary | ICD-10-CM

## 2020-01-06 DIAGNOSIS — M79.605 PAIN OF LEFT LOWER EXTREMITY: ICD-10-CM

## 2020-01-06 DIAGNOSIS — R26.9 IMPAIRED GAIT: ICD-10-CM

## 2020-01-06 PROCEDURE — 97140 MANUAL THERAPY 1/> REGIONS: CPT | Performed by: PHYSICAL THERAPIST

## 2020-01-06 PROCEDURE — 97110 THERAPEUTIC EXERCISES: CPT | Performed by: PHYSICAL THERAPIST

## 2020-01-06 NOTE — PROGRESS NOTES
Physical Therapy Daily Progress Note      Visit # 5      Subjective   Pt reports improvement after last session. Still has some pain in L anterolateral thigh. Thinks walk has improved.    Objective   See Exercise, Manual, and Modality Logs for complete treatment.       Assessment/Plan  Continues to have significantly limited L hip mobility, hip flexor tightness bilaterally, and impaired gait. Minimal improvement in pain or hip mobility with hip joint mobilization and stretching. Removed quadruped pelvic tilt and abdominal brace + LE march from [facility] as these cause increased LE pain.   Plan to progress hip mobility and core/LE strengthening as tolerated.           Manual Therapy:    15     mins  51370;  Therapeutic Exercise:    30     mins  61860;     Neuromuscular Deanna:    0    mins  34701;    Therapeutic Activity:     0     mins  73627;     Gait Trainin     mins  57330;     Ultrasound:     0     mins  26617;    Work Hardening           0      mins 45428  Iontophoresis               0   mins 44460    Timed Treatment:   45   mins   Total Treatment:     50   mins    Vira Rachel, PT  Physical Therapist

## 2020-01-07 ENCOUNTER — OFFICE VISIT (OUTPATIENT)
Dept: SPORTS MEDICINE | Facility: CLINIC | Age: 60
End: 2020-01-07

## 2020-01-07 VITALS
BODY MASS INDEX: 24.91 KG/M2 | SYSTOLIC BLOOD PRESSURE: 98 MMHG | HEIGHT: 66 IN | DIASTOLIC BLOOD PRESSURE: 58 MMHG | OXYGEN SATURATION: 98 % | HEART RATE: 74 BPM | WEIGHT: 155 LBS

## 2020-01-07 DIAGNOSIS — M16.12 PRIMARY OSTEOARTHRITIS OF LEFT HIP: ICD-10-CM

## 2020-01-07 DIAGNOSIS — M25.552 PAIN OF LEFT HIP JOINT: Primary | ICD-10-CM

## 2020-01-07 DIAGNOSIS — M54.32 LEFT SIDED SCIATICA: ICD-10-CM

## 2020-01-07 PROCEDURE — 72100 X-RAY EXAM L-S SPINE 2/3 VWS: CPT | Performed by: FAMILY MEDICINE

## 2020-01-07 PROCEDURE — 99204 OFFICE O/P NEW MOD 45 MIN: CPT | Performed by: FAMILY MEDICINE

## 2020-01-07 PROCEDURE — 73502 X-RAY EXAM HIP UNI 2-3 VIEWS: CPT | Performed by: FAMILY MEDICINE

## 2020-01-07 NOTE — PROGRESS NOTES
"Travis is a 59 y.o. year old male    Chief Complaint   Patient presents with   • Leg Pain     left side; nerve is rubbing on his back and there may be underlying issues. doesn't know if it is arthritis        History of Present Illness  L hip pain: x 4 yrs. NKI. Was told in past that he had sciatica though not currently experiencing these sxs. Pain is anterior L hip, thigh. Worse with hiking, yard work though able to cycle wo pain. Has been in PT which has helped.    I have reviewed the patient's medical, family, and social history in detail and updated the computerized patient record.    Review of Systems  Constitutional: Negative for fever.   Musculoskeletal:        Per HPI   Skin: Negative for rash.   Neurological: Negative for weakness and numbness.   Psychiatric/Behavioral: Negative for sleep disturbance.   All other systems reviewed and are negative.    BP 98/58 (BP Location: Right arm, Patient Position: Sitting, Cuff Size: Adult)   Pulse 74   Ht 168 cm (66.14\")   Wt 70.3 kg (155 lb)   SpO2 98%   BMI 24.91 kg/m²      Physical Exam    Vital signs reviewed.   General: No acute distress.  Eyes: conjunctiva clear; pupils equally round and reactive  ENT: external ears and nose atraumatic; oropharynx clear  CV: no peripheral edema, 2+ distal pulses  Resp: normal respiratory effort, no use of accessory muscles  Skin: no rashes or wounds; normal turgor  Psych: mood and affect appropriate; recent and remote memory intact  Neuro: sensation to light touch intact    MSK Exam:  L hip: decreased ROM with log roll, internal ROM. - [facility]. + MELODIE.    Left Hip X-Ray  Indication: Pain  AP and Frog Leg views    Findings:  No fracture  No bony lesion  Normal soft tissues  Mod to severe hip OA    No prior studies were available for comparison.    Lumbar Spine X-Ray  Indication: Pain  Views: AP and Lateral    Findings:  No fracture  No bony lesion  Normal soft tissues  Normal disc spaces    No prior studies were available " for comparison.      Diagnoses and all orders for this visit:    Pain of left hip joint  -     XR Hip With or Without Pelvis 2 - 3 View Left    Primary osteoarthritis of left hip  -     Ambulatory Referral to Orthopedic Surgery    Left sided sciatica  -     XR Spine Lumbar 2 or 3 View      sxs more c/w hip OA. D/w pt incl XR results. Discussed referral to ortho to consider DUSTIN. Consider IA inj - CSI, PRP.    EMR Dragon/Transcription disclaimer:    Much of this encounter note is an electronic transcription/translation of spoken language to printed text.  The electronic translation of spoken language may permit erroneous, or at times, nonsensical words or phrases to be inadvertently transcribed.  Although I have reviewed the note for such errors some may still exist.

## 2020-01-09 ENCOUNTER — TREATMENT (OUTPATIENT)
Dept: PHYSICAL THERAPY | Facility: CLINIC | Age: 60
End: 2020-01-09

## 2020-01-09 DIAGNOSIS — R26.9 IMPAIRED GAIT: ICD-10-CM

## 2020-01-09 DIAGNOSIS — M79.605 PAIN OF LEFT LOWER EXTREMITY: ICD-10-CM

## 2020-01-09 DIAGNOSIS — M54.16 RADICULOPATHY, LUMBAR REGION: Primary | ICD-10-CM

## 2020-01-09 PROCEDURE — 97110 THERAPEUTIC EXERCISES: CPT | Performed by: PHYSICAL THERAPIST

## 2020-01-09 NOTE — PROGRESS NOTES
Physical Therapy Daily Progress Note      Visit # 6      Subjective   Pt reports he had new imaging with Dr. Parikh showing L hip OA. Waiting for call to schedule evaluation by Dr. Montague.    Objective   See Exercise, Manual, and Modality Logs for complete treatment.       Assessment/Plan  Discussed what pt can do and should avoid at the gym given limitations of L hip joint. Spent >20 mins discussing lumbar and hip anatomy and pathology. Updated HEP to include hip specific strengthening in preparation for possible hip replacement. Plan to continue independently with HEP until new plan of care is established with Dr. Montague. Pt is welcome to follow with PT as needed.         Manual Therapy:    0     mins  42241;  Therapeutic Exercise:    45     mins  45475;     Neuromuscular Deanna:    0    mins  86860;    Therapeutic Activity:     0     mins  44905;     Gait Trainin     mins  02975;     Ultrasound:     0     mins  04798;    Work Hardening           0      mins 16306  Iontophoresis               0   mins 02964    Timed Treatment:   45   mins   Total Treatment:     65   mins    Vira Rachel, PT  Physical Therapist

## 2020-02-11 ENCOUNTER — OFFICE VISIT (OUTPATIENT)
Dept: ORTHOPEDIC SURGERY | Facility: CLINIC | Age: 60
End: 2020-02-11

## 2020-02-11 VITALS — HEIGHT: 66 IN | BODY MASS INDEX: 25.02 KG/M2

## 2020-02-11 DIAGNOSIS — M16.12 PRIMARY OSTEOARTHRITIS OF LEFT HIP: Primary | ICD-10-CM

## 2020-02-11 PROCEDURE — 99204 OFFICE O/P NEW MOD 45 MIN: CPT | Performed by: ORTHOPAEDIC SURGERY

## 2020-02-11 NOTE — PROGRESS NOTES
"Patient: Travis Taylor  YOB: 1960 59 y.o. male  Medical Record Number: [id number]    Chief Complaints:   Chief Complaint   Patient presents with   • Left Hip - Establish Care, Pain       History of Present Illness:Travis Taylor is a 59 y.o. male who presents with  Left hip and thigh pain -  severe and constant stabbing pain -  Worse with activity. Pain has begun to limit ADLs. Mobic helps some. Has markedly wosened over last 4-5 years.     Allergies:   Allergies   Allergen Reactions   • Penicillins Other (See Comments)       Medications:   Current Outpatient Medications   Medication Sig Dispense Refill   • calcium citrate-vitamin d (CALCIUM CITRATE + D3) 200-250 MG-UNIT tablet tablet Take  by mouth Daily.     • Multiple Vitamin (MULTIVITAMIN) tablet Take 1 tablet by mouth daily.     • Multiple Vitamins-Minerals (ANTIOXIDANT PO) Take 1 tablet by mouth daily.     • terbinafine (LAMISIL) 250 MG tablet Take 1 tablet by mouth Daily. 30 tablet 5   • vitamin B-12 (CYANOCOBALAMIN) 1000 MCG tablet Take 1,000 mcg by mouth Daily.     • vitamin C (ASCORBIC ACID) 500 MG tablet Take 500 mg by mouth daily.     • Zinc 50 MG capsule Take 1 capsule by mouth daily.     • meloxicam (MOBIC) 15 MG tablet TAKE ONE TABLET BY MOUTH DAILY 30 tablet 2     No current facility-administered medications for this visit.          The following portions of the patient's history were reviewed and updated as appropriate: allergies, current medications, past family history, past medical history, past social history, past surgical history and problem list.    Review of Systems:   A 14 point review of systems was performed. All systems negative except pertinent positives/negative listed in HPI above    Physical Exam:   Vitals:    02/11/20 0950   Height: 167.6 cm (66\")       General: A and O x 3, ASA, NAD    SCLERA:    Normal    DENTITION:   Normal  Hip:  left    LEG ALIGNMENT:     Neutral        LEG LENGTH DISCREPANCY   :    left longer by " 1 cm    GAIT:     Antalgic    SKIN:     No abnormality    RANGE OF MOTION:     Limited by joint irritability    STRENGTH:     Limited by joint irratibility    DISTAL PULSES:    Paplable    DISTAL SENSATION :   Intact    LYMPHATICS:     No   lymphadenopathy    OTHER:          +   Stinchfeld test      -    log roll      -   Tenderness to palpation trochanteric bursa        Radiology:  Xrays 2views LEFT HIP  (AP bilateral hips, and lateral of the hip) taken at the hospital were reviewed from 1/7/2020  demonstrating  advanced, end-satge osteoarthritis with bone on bone articluation, periarticular osteophytes, and subchondral cysts    Assessment/Plan: Left hip advanced OA.  Continuation of conservative management vs. DUSTIN discussed.  The patient wishes to proceed with total hip replacement.  At this point the patient has failed the full gamut of conservative treatment and stating complete understanding of the risks/benefits/ anternatives wishes to proceed with surgical treatment.    Risk and benefits of surgery were reviewed.  Including, but not limited to, blood clots, anesthesia risk, infection, leg length discrepancy, fracture, skin/leg numbness, failure of the implant, need for future surgeries, continued pain, hematoma, need for transfusion, and death, among others.  The patient understands and wishes to proceed.     The spectrum of treatment options were discussed with the patient in detail including both the nonoperative and operative treatment modalities and their respective risks and benefits.  After thorough discussion, the patient has elected to undergo surgical treatment.  The details of the surgical procedure were explained including the location of probable incisions and a description of the likely implants to be used.  Models and diagrams were used as educational resources. The patient understands the likely convalescence after surgery, as well as the rehabilitation required.  We thoroughly discussed the  risks, benefits, and alternatives to surgery.  The risks include but are not limited to the risk of infection, joint stiffness, blood clots (including DVT and/or pulmonary embolus along with the risk of death), neurologic and/or vascular injury, fracture, dislocation, nonunion, malunion, need for further surgery including hardware failure requiring revision, and continued pain.  It was explained that if tissue has been repaired or reconstructed, there is also a chance of failure which may require further management.  Following the completion of the discussion, the patient expressed understanding of this planned course of care, all their questions were answered and consent will be obtained preoperatively.    Operative Plan: Anterior approach Total Hip Replacement an overnight stay vs outpatient with home health rehab -  Will call to schedule          Jace Montague MD  2/11/2020

## 2020-02-13 ENCOUNTER — TELEPHONE (OUTPATIENT)
Dept: ORTHOPEDIC SURGERY | Facility: CLINIC | Age: 60
End: 2020-02-13

## 2020-02-18 ENCOUNTER — TELEPHONE (OUTPATIENT)
Dept: ORTHOPEDIC SURGERY | Facility: CLINIC | Age: 60
End: 2020-02-18

## 2020-02-18 NOTE — TELEPHONE ENCOUNTER
Patient called, checking on status of scheduling LEFT Hip surgery. Patient informed when RBB addresses message, Gemma will call patient to schedule surgery. Patient verbalized understanding and can be reached at [phone]. Thanks /srh

## 2020-02-22 DIAGNOSIS — M16.12 PRIMARY OSTEOARTHRITIS OF LEFT HIP: Primary | ICD-10-CM

## 2020-02-22 PROBLEM — M16.9 OA (OSTEOARTHRITIS) OF HIP: Status: ACTIVE | Noted: 2020-02-22

## 2020-02-22 RX ORDER — VANCOMYCIN HYDROCHLORIDE 1 G/200ML
15 INJECTION, SOLUTION INTRAVENOUS ONCE
Status: CANCELLED | OUTPATIENT
Start: 2020-05-13 | End: 2020-02-22

## 2020-02-22 RX ORDER — CLINDAMYCIN PHOSPHATE 900 MG/50ML
900 INJECTION INTRAVENOUS ONCE
Status: CANCELLED | OUTPATIENT
Start: 2020-05-13 | End: 2020-02-22

## 2020-02-22 RX ORDER — MELOXICAM 15 MG/1
15 TABLET ORAL ONCE
Status: CANCELLED | OUTPATIENT
Start: 2020-05-13 | End: 2020-02-22

## 2020-02-22 RX ORDER — PREGABALIN 75 MG/1
150 CAPSULE ORAL ONCE
Status: CANCELLED | OUTPATIENT
Start: 2020-05-13 | End: 2020-02-22

## 2020-02-27 PROBLEM — M16.12 PRIMARY OSTEOARTHRITIS OF LEFT HIP: Status: ACTIVE | Noted: 2020-02-27

## 2020-04-23 ENCOUNTER — APPOINTMENT (OUTPATIENT)
Dept: PREADMISSION TESTING | Facility: HOSPITAL | Age: 60
End: 2020-04-23

## 2020-04-24 ENCOUNTER — APPOINTMENT (OUTPATIENT)
Dept: PREADMISSION TESTING | Facility: HOSPITAL | Age: 60
End: 2020-04-24

## 2020-04-24 VITALS
BODY MASS INDEX: 25.71 KG/M2 | RESPIRATION RATE: 16 BRPM | WEIGHT: 160 LBS | SYSTOLIC BLOOD PRESSURE: 118 MMHG | HEIGHT: 66 IN | DIASTOLIC BLOOD PRESSURE: 78 MMHG | OXYGEN SATURATION: 100 % | TEMPERATURE: 98.5 F | HEART RATE: 83 BPM

## 2020-04-24 DIAGNOSIS — M16.12 PRIMARY OSTEOARTHRITIS OF LEFT HIP: ICD-10-CM

## 2020-04-24 LAB
ANION GAP SERPL CALCULATED.3IONS-SCNC: 9.6 MMOL/L (ref 5–15)
BILIRUB UR QL STRIP: NEGATIVE
BUN BLD-MCNC: 15 MG/DL (ref 6–20)
BUN/CREAT SERPL: 19 (ref 7–25)
CALCIUM SPEC-SCNC: 9 MG/DL (ref 8.6–10.5)
CHLORIDE SERPL-SCNC: 103 MMOL/L (ref 98–107)
CLARITY UR: CLEAR
CO2 SERPL-SCNC: 26.4 MMOL/L (ref 22–29)
COLOR UR: YELLOW
CREAT BLD-MCNC: 0.79 MG/DL (ref 0.76–1.27)
DEPRECATED RDW RBC AUTO: 45.5 FL (ref 37–54)
ERYTHROCYTE [DISTWIDTH] IN BLOOD BY AUTOMATED COUNT: 12.8 % (ref 12.3–15.4)
GFR SERPL CREATININE-BSD FRML MDRD: 101 ML/MIN/1.73
GLUCOSE BLD-MCNC: 108 MG/DL (ref 65–99)
GLUCOSE UR STRIP-MCNC: NEGATIVE MG/DL
HCT VFR BLD AUTO: 45 % (ref 37.5–51)
HGB BLD-MCNC: 15.1 G/DL (ref 13–17.7)
HGB UR QL STRIP.AUTO: NEGATIVE
KETONES UR QL STRIP: NEGATIVE
LEUKOCYTE ESTERASE UR QL STRIP.AUTO: NEGATIVE
MCH RBC QN AUTO: 32.1 PG (ref 26.6–33)
MCHC RBC AUTO-ENTMCNC: 33.6 G/DL (ref 31.5–35.7)
MCV RBC AUTO: 95.5 FL (ref 79–97)
NITRITE UR QL STRIP: NEGATIVE
PH UR STRIP.AUTO: <=5 [PH] (ref 5–8)
PLATELET # BLD AUTO: 237 10*3/MM3 (ref 140–450)
PMV BLD AUTO: 8.6 FL (ref 6–12)
POTASSIUM BLD-SCNC: 4.6 MMOL/L (ref 3.5–5.2)
PROT UR QL STRIP: NEGATIVE
RBC # BLD AUTO: 4.71 10*6/MM3 (ref 4.14–5.8)
SODIUM BLD-SCNC: 139 MMOL/L (ref 136–145)
SP GR UR STRIP: 1.01 (ref 1–1.03)
UROBILINOGEN UR QL STRIP: NORMAL
WBC NRBC COR # BLD: 4.07 10*3/MM3 (ref 3.4–10.8)

## 2020-04-24 PROCEDURE — 85027 COMPLETE CBC AUTOMATED: CPT | Performed by: ORTHOPAEDIC SURGERY

## 2020-04-24 PROCEDURE — 36415 COLL VENOUS BLD VENIPUNCTURE: CPT

## 2020-04-24 PROCEDURE — 93005 ELECTROCARDIOGRAM TRACING: CPT

## 2020-04-24 PROCEDURE — 81003 URINALYSIS AUTO W/O SCOPE: CPT | Performed by: ORTHOPAEDIC SURGERY

## 2020-04-24 PROCEDURE — 80048 BASIC METABOLIC PNL TOTAL CA: CPT | Performed by: ORTHOPAEDIC SURGERY

## 2020-04-24 PROCEDURE — 93010 ELECTROCARDIOGRAM REPORT: CPT | Performed by: INTERNAL MEDICINE

## 2020-04-24 ASSESSMENT — HOOS JR
HOOS JR SCORE: 14
HOOS JR SCORE: 46.652

## 2020-04-24 NOTE — DISCHARGE INSTRUCTIONS
Take the following medications the morning of surgery:    NONE    ARRIVE AT 9:00    General Instructions:  • Do not eat solid food after midnight the night before surgery.  • You may drink clear liquids day of surgery but must stop at least one hour before your hospital arrival time.  • It is beneficial for you to have a clear drink that contains carbohydrates the day of surgery.  We suggest a 12 to 20 ounce bottle of Gatorade or Powerade for non-diabetic patients or a 12 to 20 ounce bottle of G2 or Powerade Zero for diabetic patients. (Pediatric patients, are not advised to drink a 12 to 20 ounce carbohydrate drink)    Clear liquids are liquids you can see through.  Nothing red in color.     Plain water                               Sports drinks  Sodas                                   Gelatin (Jell-O)  Fruit juices without pulp such as white grape juice and apple juice  Popsicles that contain no fruit or yogurt  Tea or coffee (no cream or milk added)  Gatorade / Powerade  G2 / Powerade Zero    • Infants may have breast milk up to four hours before surgery.  • Infants drinking formula may drink formula up to six hours before surgery.   • Patients who avoid smoking, chewing tobacco and alcohol for 4 weeks prior to surgery have a reduced risk of post-operative complications.  Quit smoking as many days before surgery as you can.  • Do not smoke, use chewing tobacco or drink alcohol the day of surgery.   • If applicable bring your C-PAP/ BI-PAP machine.  • Bring any papers given to you in the doctor’s office.  • Wear clean comfortable clothes.  • Do not wear contact lenses, false eyelashes or make-up.  Bring a case for your glasses.   • Bring crutches or walker if applicable.  • Remove all piercings.  Leave jewelry and any other valuables at home.  • Hair extensions with metal clips must be removed prior to surgery.  • The Pre-Admission Testing nurse will instruct you to bring medications if unable to obtain an  accurate list in Pre-Admission Testing.        If you were given a blood bank ID arm band remember to bring it with you the day of surgery.    Preventing a Surgical Site Infection:  • For 2 to 3 days before surgery, avoid shaving with a razor because the razor can irritate skin and make it easier to develop an infection.    • Any areas of open skin can increase the risk of a post-operative wound infection by allowing bacteria to enter and travel throughout the body.  Notify your surgeon if you have any skin wounds / rashes even if it is not near the expected surgical site.  The area will need assessed to determine if surgery should be delayed until it is healed.  • The night prior to surgery shower using a fresh bar of anti-bacterial soap (such as Dial) and clean washcloth.  Sleep in a clean bed with clean clothing.  Do not allow pets to sleep with you.  • Shower on the morning of surgery using a fresh bar of anti-bacterial soap (such as Dial) and clean washcloth.  Dry with a clean towel and dress in clean clothing.  • Ask your surgeon if you will be receiving antibiotics prior to surgery.  • Make sure you, your family, and all healthcare providers clean their hands with soap and water or an alcohol based hand  before caring for you or your wound.    Day of surgery:  Your arrival time is approximately two hours before your scheduled surgery time.  Upon arrival, a Pre-op nurse and Anesthesiologist will review your health history, obtain vital signs, and answer questions you may have.  The only belongings needed at this time will be a list of your home medications and if applicable your C-PAP/BI-PAP machine.  If you are staying overnight your family can leave the rest of your belongings in the car and bring them to your room later.  A Pre-op nurse will start an IV and you may receive medication in preparation for surgery, including something to help you relax.  Your family will be able to see you in the  Pre-op area.  Two visitors at a time will be allowed in the Pre-op room.  While you are in surgery your family should notify the waiting room  if they leave the waiting room area and provide a contact phone number.    Please be aware that surgery does come with discomfort.  We want to make every effort to control your discomfort so please discuss any uncontrolled symptoms with your nurse.   Your doctor will most likely have prescribed pain medications.      If you are going home after surgery you will receive individualized written care instructions before being discharged.  A responsible adult must drive you to and from the hospital on the day of your surgery and stay with you for 24 hours.    If you are staying overnight following surgery, you will be transported to your hospital room following the recovery period.  [facility] has all private rooms.    If you have any questions please call Pre-Admission Testing at [phone].  Deductibles and co-payments are collected on the day of service. Please be prepared to pay the required co-pay, deductible or deposit on the day of service as defined by your plan.CHLORHEXIDINE CLOTH INSTRUCTIONS  The morning of surgery follow these instructions using the Chlorhexidine cloths you've been given.  These steps reduce bacteria on the body.  Do not use the cloths near your eyes, ears mouth, genitalia or on open wounds.  Throw the cloths away after use but do not try to flush them down a toilet.      • Open and remove one cloth at a time from the package.    • Leave the cloth unfolded and begin the bathing.  • Massage the skin with the cloths using gentle pressure to remove bacteria.  Do not scrub harshly.   • Follow the steps below with one 2% CHG cloth per area (6 total cloths).  • One cloth for neck, shoulders and chest.  • One cloth for both arms, hands, fingers and underarms (do underarms last).  • One cloth for the abdomen followed by  groin.  • One cloth for right leg and foot including between the toes.  • One cloth for left leg and foot including between the toes.  • The last cloth is to be used for the back of the neck, back and buttocks.    Allow the CHG to air dry 3 minutes on the skin which will give it time to work and decrease the chance of irritation.  The skin may feel sticky until it is dry.  Do not rinse with water or any other liquid or you will lose the beneficial effects of the CHG.  If mild skin irritation occurs, do rinse the skin to remove the CHG.  Report this to the nurse at time of admission.  Do not apply lotions, creams, ointments, deodorants or perfumes after using the clothes. Dress in clean clothes before coming to the hospital.    BACTROBAN NASAL OINTMENT  There are many germs normally in your nose. Bactroban is an ointment that will help reduce these germs. Please follow these instructions for Bactroban use:      ____The day before surgery in the morning  Date________    ____The day before surgery in the evening              Date________    ____The day of surgery in the morning    Date________    **Squirt ½ package of Bactroban Ointment onto a cotton applicator and apply to inside of 1st nostril.  Squirt the remaining Bactroban and apply to the inside of the other nostril.    PERIDEX- ORAL:  Use only if your surgeon has ordered  Use the night before and morning of surgery - Swish, gargle, and spit - do not swallow.

## 2020-05-14 ENCOUNTER — PREP FOR SURGERY (OUTPATIENT)
Dept: OTHER | Facility: HOSPITAL | Age: 60
End: 2020-05-14

## 2020-06-25 ENCOUNTER — OFFICE VISIT (OUTPATIENT)
Dept: ORTHOPEDIC SURGERY | Facility: CLINIC | Age: 60
End: 2020-06-25

## 2020-06-25 VITALS
HEIGHT: 66 IN | BODY MASS INDEX: 24.91 KG/M2 | SYSTOLIC BLOOD PRESSURE: 124 MMHG | TEMPERATURE: 98.6 F | DIASTOLIC BLOOD PRESSURE: 64 MMHG | WEIGHT: 155 LBS

## 2020-06-25 DIAGNOSIS — M16.12 PRIMARY OSTEOARTHRITIS OF LEFT HIP: Primary | ICD-10-CM

## 2020-06-25 PROCEDURE — 73502 X-RAY EXAM HIP UNI 2-3 VIEWS: CPT | Performed by: NURSE PRACTITIONER

## 2020-06-25 PROCEDURE — S0260 H&P FOR SURGERY: HCPCS | Performed by: NURSE PRACTITIONER

## 2020-06-25 NOTE — H&P
Patient: Travis Taylor    Date of Admission: 6/29/2020    YOB: 1960    Medical Record Number: [id number]    Admitting Physician: Dr. Jace Montague    Reason for Admission: End Stage Left Hip OA    History of Present Illness: 59 y.o. male presents with severe end stage hip osteoarthritis which has not been responsive to the full compliment of conservative measures. Despite conservative attempts, there is still severe, constant activity limiting hip pain. Given the severity of the pain, the patient has elected to proceed with hip replacement.    Allergies:   Allergies   Allergen Reactions   • Penicillins Rash         Current Medications:  Home Medications:    Current Outpatient Medications on File Prior to Visit   Medication Sig   • calcium citrate-vitamin d (CALCIUM CITRATE + D3) 200-250 MG-UNIT tablet tablet Take 1 tablet by mouth Daily.   • Chlorhexidine Gluconate 2 % pads Apply  topically. As directed pre op   • meloxicam (MOBIC) 15 MG tablet TAKE ONE TABLET BY MOUTH DAILY (Patient taking differently: Take 15 mg by mouth. TO HOLD 1 WEEK BEFORE SURGERY)   • Multiple Vitamin (MULTIVITAMIN) tablet Take 1 tablet by mouth Daily. TO HOLD 1 WEEK BEFORE SURGERY   • Multiple Vitamins-Minerals (ANTIOXIDANT PO) Take 1 tablet by mouth Daily. TO HOLD 1 WEEK BEFORE SURGERY   • mupirocin (BACTROBAN) 2 % nasal ointment into the nostril(s) as directed by provider. As directed pre op   • vitamin B-12 (CYANOCOBALAMIN) 1000 MCG tablet Take 1,000 mcg by mouth Daily.   • vitamin C (ASCORBIC ACID) 500 MG tablet Take 500 mg by mouth daily.   • Zinc 50 MG capsule Take 1 capsule by mouth daily.     No current facility-administered medications on file prior to visit.      PRN Meds:.    PMH:  Past Medical History:   Diagnosis Date   • DDD (degenerative disc disease), lumbar     L4-5   • H/O Broken jaw (CMS/HCC) 1968   • History of concussion 1968   • Left hip pain    • Leukopenia 2018   • PONV (postoperative nausea and vomiting)   "       PSURGH:  Past Surgical History:   Procedure Laterality Date   • COLONOSCOPY  2012   • HERNIA REPAIR      two serperate surgeries done in 2009 & the other in 2012   • INNER EAR SURGERY Right 1983    Punctured drum       SocialHx:  Social History     Occupational History   • Occupation:      Employer: [email or web address] [place]   Tobacco Use   • Smoking status: Never Smoker   • Smokeless tobacco: Never Used   Substance and Sexual Activity   • Alcohol use: Yes     Comment: socially   • Drug use: No   • Sexual activity: Defer      Social History     Social History Narrative    2 grown children       FamHx:  Family History   Problem Relation Age of Onset   • Hypertension Mother    • Arthritis Mother    • Diabetes Mother    • Stroke Father 85   • Dementia Father    • Depression Father    • Lung cancer Paternal Grandfather         & possible mouth cancer; was a smoker.    • Malig Hyperthermia Neg Hx          Review of Systems:   A 14 point review of systems was performed, pertinent positives discussed above, all other systems are negative    Physical Exam: 59 y.o. male  Vital Signs :   Vitals:    06/25/20 1301   BP: 124/64   Temp: 98.6 °F (37 °C)   TempSrc: Temporal   Weight: 70.3 kg (155 lb)   Height: 167.6 cm (66\")   PainSc:   7     General: Alert and Oriented x 3, No acute distress.  Psych: mood and affect appropriate; recent and remote memory intact  Eyes: conjunctiva clear; pupils equally round and reactive, sclera nonicteric  CV: no peripheral edema  Resp: normal respiratory effort  Skin: no rashes or wounds; normal turgor  Musculosketetal; pain with hip range of motion. Positive stinchfeld test. No trochanteric  Tenderness.  Vascular: palpable distal pulses    Labs:    No visits with results within 3 Week(s) from this visit.   Latest known visit with results is:   Appointment on 04/24/2020   Component Date Value Ref Range Status   • Glucose 04/24/2020 108* 65 - 99 mg/dL Final   • BUN 04/24/2020 " 15  6 - 20 mg/dL Final   • Creatinine 04/24/2020 0.79  0.76 - 1.27 mg/dL Final   • Sodium 04/24/2020 139  136 - 145 mmol/L Final   • Potassium 04/24/2020 4.6  3.5 - 5.2 mmol/L Final   • Chloride 04/24/2020 103  98 - 107 mmol/L Final   • CO2 04/24/2020 26.4  22.0 - 29.0 mmol/L Final   • Calcium 04/24/2020 9.0  8.6 - 10.5 mg/dL Final   • eGFR Non African Amer 04/24/2020 101  >60 mL/min/1.73 Final   • BUN/Creatinine Ratio 04/24/2020 19.0  7.0 - 25.0 Final   • Anion Gap 04/24/2020 9.6  5.0 - 15.0 mmol/L Final   • WBC 04/24/2020 4.07  3.40 - 10.80 10*3/mm3 Final   • RBC 04/24/2020 4.71  4.14 - 5.80 10*6/mm3 Final   • Hemoglobin 04/24/2020 15.1  13.0 - 17.7 g/dL Final   • Hematocrit 04/24/2020 45.0  37.5 - 51.0 % Final   • MCV 04/24/2020 95.5  79.0 - 97.0 fL Final   • MCH 04/24/2020 32.1  26.6 - 33.0 pg Final   • MCHC 04/24/2020 33.6  31.5 - 35.7 g/dL Final   • RDW 04/24/2020 12.8  12.3 - 15.4 % Final   • RDW-SD 04/24/2020 45.5  37.0 - 54.0 fl Final   • MPV 04/24/2020 8.6  6.0 - 12.0 fL Final   • Platelets 04/24/2020 237  140 - 450 10*3/mm3 Final   • Color, UA 04/24/2020 Yellow  Yellow, Straw Final   • Appearance, UA 04/24/2020 Clear  Clear Final   • pH, UA 04/24/2020 <=5.0  5.0 - 8.0 Final   • Specific Gravity, UA 04/24/2020 1.015  1.005 - 1.030 Final   • Glucose, UA 04/24/2020 Negative  Negative Final   • Ketones, UA 04/24/2020 Negative  Negative Final   • Bilirubin, UA 04/24/2020 Negative  Negative Final   • Blood, UA 04/24/2020 Negative  Negative Final   • Protein, UA 04/24/2020 Negative  Negative Final   • Leuk Esterase, UA 04/24/2020 Negative  Negative Final   • Nitrite, UA 04/24/2020 Negative  Negative Final   • Urobilinogen, UA 04/24/2020 0.2 E.U./dL  0.2 - 1.0 E.U./dL Final     Xrays:  Xrays AP pelvis and a lateral of the Left hip were ordered and reviewed demonstrating  End stage hip OA with bone on bone articulation, subchondral cysts and periarticular osteophytes.    Assessment:  End-stage Left hip  osteoarthritis. Conservative measures have failed.      Plan:  The plan is to proceed with Left Total Hip Replacement. The patient voiced understanding of the risks, benefits, and alternative forms of treatment that were discussed with Dr Montague at the time of scheduling.  Same day [facility]    Meli Cardona, APRN  6/25/2020

## 2020-06-26 ENCOUNTER — APPOINTMENT (OUTPATIENT)
Dept: PREADMISSION TESTING | Facility: HOSPITAL | Age: 60
End: 2020-06-26

## 2020-06-26 VITALS
TEMPERATURE: 98.3 F | DIASTOLIC BLOOD PRESSURE: 66 MMHG | HEIGHT: 66 IN | SYSTOLIC BLOOD PRESSURE: 103 MMHG | HEART RATE: 60 BPM | OXYGEN SATURATION: 100 % | BODY MASS INDEX: 25.07 KG/M2 | RESPIRATION RATE: 16 BRPM | WEIGHT: 156 LBS

## 2020-06-26 DIAGNOSIS — M16.12 PRIMARY OSTEOARTHRITIS OF LEFT HIP: ICD-10-CM

## 2020-06-26 LAB
ANION GAP SERPL CALCULATED.3IONS-SCNC: 4.3 MMOL/L (ref 5–15)
BUN BLD-MCNC: 15 MG/DL (ref 6–20)
BUN/CREAT SERPL: 16.5 (ref 7–25)
CALCIUM SPEC-SCNC: 9.1 MG/DL (ref 8.6–10.5)
CHLORIDE SERPL-SCNC: 104 MMOL/L (ref 98–107)
CO2 SERPL-SCNC: 29.7 MMOL/L (ref 22–29)
CREAT BLD-MCNC: 0.91 MG/DL (ref 0.76–1.27)
DEPRECATED RDW RBC AUTO: 43.8 FL (ref 37–54)
ERYTHROCYTE [DISTWIDTH] IN BLOOD BY AUTOMATED COUNT: 12.6 % (ref 12.3–15.4)
GFR SERPL CREATININE-BSD FRML MDRD: 86 ML/MIN/1.73
GLUCOSE BLD-MCNC: 79 MG/DL (ref 65–99)
HCT VFR BLD AUTO: 45.4 % (ref 37.5–51)
HGB BLD-MCNC: 15.5 G/DL (ref 13–17.7)
MCH RBC QN AUTO: 32.2 PG (ref 26.6–33)
MCHC RBC AUTO-ENTMCNC: 34.1 G/DL (ref 31.5–35.7)
MCV RBC AUTO: 94.4 FL (ref 79–97)
PLATELET # BLD AUTO: 234 10*3/MM3 (ref 140–450)
PMV BLD AUTO: 8 FL (ref 6–12)
POTASSIUM BLD-SCNC: 4.2 MMOL/L (ref 3.5–5.2)
RBC # BLD AUTO: 4.81 10*6/MM3 (ref 4.14–5.8)
SODIUM BLD-SCNC: 138 MMOL/L (ref 136–145)
WBC NRBC COR # BLD: 3.52 10*3/MM3 (ref 3.4–10.8)

## 2020-06-26 PROCEDURE — U0004 COV-19 TEST NON-CDC HGH THRU: HCPCS | Performed by: NURSE PRACTITIONER

## 2020-06-26 PROCEDURE — C9803 HOPD COVID-19 SPEC COLLECT: HCPCS | Performed by: NURSE PRACTITIONER

## 2020-06-26 PROCEDURE — 85027 COMPLETE CBC AUTOMATED: CPT | Performed by: ORTHOPAEDIC SURGERY

## 2020-06-26 PROCEDURE — 80048 BASIC METABOLIC PNL TOTAL CA: CPT | Performed by: ORTHOPAEDIC SURGERY

## 2020-06-26 PROCEDURE — 36415 COLL VENOUS BLD VENIPUNCTURE: CPT

## 2020-06-27 LAB
REF LAB TEST METHOD: NORMAL
SARS-COV-2 RNA RESP QL NAA+PROBE: NOT DETECTED

## 2020-06-29 ENCOUNTER — ANESTHESIA EVENT (OUTPATIENT)
Dept: PERIOP | Facility: HOSPITAL | Age: 60
End: 2020-06-29

## 2020-06-29 ENCOUNTER — ANESTHESIA (OUTPATIENT)
Dept: PERIOP | Facility: HOSPITAL | Age: 60
End: 2020-06-29

## 2020-06-29 ENCOUNTER — APPOINTMENT (OUTPATIENT)
Dept: GENERAL RADIOLOGY | Facility: HOSPITAL | Age: 60
End: 2020-06-29

## 2020-06-29 ENCOUNTER — HOSPITAL ENCOUNTER (OUTPATIENT)
Facility: HOSPITAL | Age: 60
Discharge: HOME HEALTH | End: 2020-06-30
Attending: ORTHOPAEDIC SURGERY | Admitting: ORTHOPAEDIC SURGERY

## 2020-06-29 DIAGNOSIS — M16.12 PRIMARY OSTEOARTHRITIS OF LEFT HIP: ICD-10-CM

## 2020-06-29 LAB
ABO GROUP BLD: NORMAL
BLD GP AB SCN SERPL QL: NEGATIVE
RH BLD: POSITIVE
T&S EXPIRATION DATE: NORMAL

## 2020-06-29 PROCEDURE — 97110 THERAPEUTIC EXERCISES: CPT

## 2020-06-29 PROCEDURE — 25010000002 ONDANSETRON PER 1 MG: Performed by: NURSE ANESTHETIST, CERTIFIED REGISTERED

## 2020-06-29 PROCEDURE — 25010000002 DEXAMETHASONE PER 1 MG: Performed by: NURSE ANESTHETIST, CERTIFIED REGISTERED

## 2020-06-29 PROCEDURE — 25010000003 CEFAZOLIN IN DEXTROSE 2-4 GM/100ML-% SOLUTION: Performed by: ORTHOPAEDIC SURGERY

## 2020-06-29 PROCEDURE — 25010000002 KETOROLAC TROMETHAMINE PER 15 MG: Performed by: ORTHOPAEDIC SURGERY

## 2020-06-29 PROCEDURE — G0378 HOSPITAL OBSERVATION PER HR: HCPCS

## 2020-06-29 PROCEDURE — 25010000002 FENTANYL CITRATE (PF) 100 MCG/2ML SOLUTION: Performed by: NURSE ANESTHETIST, CERTIFIED REGISTERED

## 2020-06-29 PROCEDURE — 25010000002 MORPHINE (PF) 10 MG/ML SOLUTION 1 ML CARTRIDGE: Performed by: ORTHOPAEDIC SURGERY

## 2020-06-29 PROCEDURE — 25010000002 EPINEPHRINE 30 MG/30ML SOLUTION 30 ML VIAL: Performed by: ORTHOPAEDIC SURGERY

## 2020-06-29 PROCEDURE — 25010000002 ROPIVACAINE PER 1 MG: Performed by: ORTHOPAEDIC SURGERY

## 2020-06-29 PROCEDURE — 25010000002 ONDANSETRON PER 1 MG: Performed by: ORTHOPAEDIC SURGERY

## 2020-06-29 PROCEDURE — C1776 JOINT DEVICE (IMPLANTABLE): HCPCS | Performed by: ORTHOPAEDIC SURGERY

## 2020-06-29 PROCEDURE — 94799 UNLISTED PULMONARY SVC/PX: CPT

## 2020-06-29 PROCEDURE — 76000 FLUOROSCOPY <1 HR PHYS/QHP: CPT

## 2020-06-29 PROCEDURE — 27130 TOTAL HIP ARTHROPLASTY: CPT | Performed by: ORTHOPAEDIC SURGERY

## 2020-06-29 PROCEDURE — 25010000002 NEOSTIGMINE PER 0.5 MG: Performed by: NURSE ANESTHETIST, CERTIFIED REGISTERED

## 2020-06-29 PROCEDURE — 73501 X-RAY EXAM HIP UNI 1 VIEW: CPT

## 2020-06-29 PROCEDURE — 25010000002 HYDROMORPHONE PER 4 MG: Performed by: NURSE ANESTHETIST, CERTIFIED REGISTERED

## 2020-06-29 PROCEDURE — 25010000002 MIDAZOLAM PER 1 MG: Performed by: ANESTHESIOLOGY

## 2020-06-29 PROCEDURE — 86850 RBC ANTIBODY SCREEN: CPT | Performed by: ORTHOPAEDIC SURGERY

## 2020-06-29 PROCEDURE — 86900 BLOOD TYPING SEROLOGIC ABO: CPT | Performed by: ORTHOPAEDIC SURGERY

## 2020-06-29 PROCEDURE — 25010000002 PHENYLEPHRINE PER 1 ML: Performed by: NURSE ANESTHETIST, CERTIFIED REGISTERED

## 2020-06-29 PROCEDURE — 86901 BLOOD TYPING SEROLOGIC RH(D): CPT | Performed by: ORTHOPAEDIC SURGERY

## 2020-06-29 PROCEDURE — 25010000002 PROPOFOL 10 MG/ML EMULSION: Performed by: NURSE ANESTHETIST, CERTIFIED REGISTERED

## 2020-06-29 PROCEDURE — 97162 PT EVAL MOD COMPLEX 30 MIN: CPT

## 2020-06-29 PROCEDURE — 25010000002 VANCOMYCIN PER 500 MG: Performed by: ORTHOPAEDIC SURGERY

## 2020-06-29 DEVICE — DEV CONTRL TISS STRATAFIX SPIRAL MNCRYL UD 3/0 PLS 30CM: Type: IMPLANTABLE DEVICE | Site: HIP | Status: FUNCTIONAL

## 2020-06-29 DEVICE — R3 3 HOLE ACETABULAR SHELL 52MM
Type: IMPLANTABLE DEVICE | Site: HIP | Status: FUNCTIONAL
Brand: R3 ACETABULAR

## 2020-06-29 DEVICE — REFLECTION SPHERICAL HEAD SCREW 20MM
Type: IMPLANTABLE DEVICE | Site: HIP | Status: FUNCTIONAL
Brand: REFLECTION

## 2020-06-29 DEVICE — OXINIUM FEMORAL HEAD 12/14 TAPER                                    36 MM -3
Type: IMPLANTABLE DEVICE | Site: HIP | Status: FUNCTIONAL
Brand: OXINIUM

## 2020-06-29 DEVICE — R3 0 DEGREE XLPE ACETABULAR LINER                                    36MM INNER DIAMETER X OUTER DIAMETER 52MM
Type: IMPLANTABLE DEVICE | Site: HIP | Status: FUNCTIONAL
Brand: R3

## 2020-06-29 DEVICE — DEV WND/CLS CONTRL TISS STRATAFIX SPIRAL PDS PLS CT1 0 30CM: Type: IMPLANTABLE DEVICE | Site: HIP | Status: FUNCTIONAL

## 2020-06-29 DEVICE — POLARSTEM STANDARD NON-CEMENTED                                    WITH TI/HA 3
Type: IMPLANTABLE DEVICE | Site: HIP | Status: FUNCTIONAL
Brand: POLARSTEM

## 2020-06-29 DEVICE — IMPLANTABLE DEVICE: Type: IMPLANTABLE DEVICE | Site: HIP | Status: FUNCTIONAL

## 2020-06-29 RX ORDER — GLYCOPYRROLATE 0.2 MG/ML
INJECTION INTRAMUSCULAR; INTRAVENOUS AS NEEDED
Status: DISCONTINUED | OUTPATIENT
Start: 2020-06-29 | End: 2020-06-29 | Stop reason: SURG

## 2020-06-29 RX ORDER — MIDAZOLAM HYDROCHLORIDE 1 MG/ML
1 INJECTION INTRAMUSCULAR; INTRAVENOUS
Status: COMPLETED | OUTPATIENT
Start: 2020-06-29 | End: 2020-06-29

## 2020-06-29 RX ORDER — EPHEDRINE SULFATE 50 MG/ML
INJECTION, SOLUTION INTRAVENOUS AS NEEDED
Status: DISCONTINUED | OUTPATIENT
Start: 2020-06-29 | End: 2020-06-29 | Stop reason: SURG

## 2020-06-29 RX ORDER — HYDROCODONE BITARTRATE AND ACETAMINOPHEN 7.5; 325 MG/1; MG/1
1 TABLET ORAL ONCE AS NEEDED
Status: DISCONTINUED | OUTPATIENT
Start: 2020-06-29 | End: 2020-06-29 | Stop reason: HOSPADM

## 2020-06-29 RX ORDER — FLUMAZENIL 0.1 MG/ML
0.2 INJECTION INTRAVENOUS AS NEEDED
Status: DISCONTINUED | OUTPATIENT
Start: 2020-06-29 | End: 2020-06-29 | Stop reason: HOSPADM

## 2020-06-29 RX ORDER — MAGNESIUM HYDROXIDE 1200 MG/15ML
LIQUID ORAL AS NEEDED
Status: DISCONTINUED | OUTPATIENT
Start: 2020-06-29 | End: 2020-06-29 | Stop reason: HOSPADM

## 2020-06-29 RX ORDER — ACETAMINOPHEN 10 MG/ML
1000 INJECTION, SOLUTION INTRAVENOUS ONCE
Status: COMPLETED | OUTPATIENT
Start: 2020-06-29 | End: 2020-06-29

## 2020-06-29 RX ORDER — TRANEXAMIC ACID 100 MG/ML
INJECTION, SOLUTION INTRAVENOUS AS NEEDED
Status: DISCONTINUED | OUTPATIENT
Start: 2020-06-29 | End: 2020-06-29 | Stop reason: SURG

## 2020-06-29 RX ORDER — FENTANYL CITRATE 50 UG/ML
50 INJECTION, SOLUTION INTRAMUSCULAR; INTRAVENOUS
Status: DISCONTINUED | OUTPATIENT
Start: 2020-06-29 | End: 2020-06-29 | Stop reason: HOSPADM

## 2020-06-29 RX ORDER — SODIUM CHLORIDE 0.9 % (FLUSH) 0.9 %
3 SYRINGE (ML) INJECTION EVERY 12 HOURS SCHEDULED
Status: DISCONTINUED | OUTPATIENT
Start: 2020-06-29 | End: 2020-06-29 | Stop reason: HOSPADM

## 2020-06-29 RX ORDER — CEFAZOLIN SODIUM 2 G/100ML
2 INJECTION, SOLUTION INTRAVENOUS EVERY 8 HOURS
Status: COMPLETED | OUTPATIENT
Start: 2020-06-29 | End: 2020-06-30

## 2020-06-29 RX ORDER — PROMETHAZINE HYDROCHLORIDE 25 MG/ML
12.5 INJECTION, SOLUTION INTRAMUSCULAR; INTRAVENOUS ONCE AS NEEDED
Status: DISCONTINUED | OUTPATIENT
Start: 2020-06-29 | End: 2020-06-29 | Stop reason: HOSPADM

## 2020-06-29 RX ORDER — OXYCODONE AND ACETAMINOPHEN 7.5; 325 MG/1; MG/1
1 TABLET ORAL ONCE AS NEEDED
Status: DISCONTINUED | OUTPATIENT
Start: 2020-06-29 | End: 2020-06-29 | Stop reason: HOSPADM

## 2020-06-29 RX ORDER — VANCOMYCIN HYDROCHLORIDE 1 G/200ML
15 INJECTION, SOLUTION INTRAVENOUS ONCE
Status: COMPLETED | OUTPATIENT
Start: 2020-06-29 | End: 2020-06-29

## 2020-06-29 RX ORDER — PANTOPRAZOLE SODIUM 40 MG/1
40 TABLET, DELAYED RELEASE ORAL DAILY
Qty: 14 TABLET | Refills: 0 | Status: SHIPPED | OUTPATIENT
Start: 2020-06-29 | End: 2020-07-13

## 2020-06-29 RX ORDER — PANTOPRAZOLE SODIUM 40 MG/1
40 TABLET, DELAYED RELEASE ORAL
Status: DISCONTINUED | OUTPATIENT
Start: 2020-06-30 | End: 2020-06-30 | Stop reason: HOSPADM

## 2020-06-29 RX ORDER — SODIUM CHLORIDE, SODIUM LACTATE, POTASSIUM CHLORIDE, CALCIUM CHLORIDE 600; 310; 30; 20 MG/100ML; MG/100ML; MG/100ML; MG/100ML
9 INJECTION, SOLUTION INTRAVENOUS CONTINUOUS
Status: DISCONTINUED | OUTPATIENT
Start: 2020-06-29 | End: 2020-06-30 | Stop reason: HOSPADM

## 2020-06-29 RX ORDER — DIPHENHYDRAMINE HYDROCHLORIDE 50 MG/ML
12.5 INJECTION INTRAMUSCULAR; INTRAVENOUS
Status: DISCONTINUED | OUTPATIENT
Start: 2020-06-29 | End: 2020-06-29 | Stop reason: HOSPADM

## 2020-06-29 RX ORDER — DIPHENHYDRAMINE HCL 25 MG
25 CAPSULE ORAL
Status: DISCONTINUED | OUTPATIENT
Start: 2020-06-29 | End: 2020-06-29 | Stop reason: HOSPADM

## 2020-06-29 RX ORDER — HYDROMORPHONE HYDROCHLORIDE 1 MG/ML
0.5 INJECTION, SOLUTION INTRAMUSCULAR; INTRAVENOUS; SUBCUTANEOUS
Status: DISCONTINUED | OUTPATIENT
Start: 2020-06-29 | End: 2020-06-29 | Stop reason: HOSPADM

## 2020-06-29 RX ORDER — PROPOFOL 10 MG/ML
VIAL (ML) INTRAVENOUS AS NEEDED
Status: DISCONTINUED | OUTPATIENT
Start: 2020-06-29 | End: 2020-06-29 | Stop reason: SURG

## 2020-06-29 RX ORDER — EPHEDRINE SULFATE 50 MG/ML
5 INJECTION, SOLUTION INTRAVENOUS ONCE AS NEEDED
Status: DISCONTINUED | OUTPATIENT
Start: 2020-06-29 | End: 2020-06-29 | Stop reason: HOSPADM

## 2020-06-29 RX ORDER — ONDANSETRON 4 MG/1
4 TABLET, FILM COATED ORAL EVERY 6 HOURS PRN
Qty: 10 TABLET | Refills: 0 | Status: SHIPPED | OUTPATIENT
Start: 2020-06-29 | End: 2020-11-10

## 2020-06-29 RX ORDER — ASPIRIN 81 MG/1
81 TABLET ORAL EVERY 12 HOURS SCHEDULED
Status: DISCONTINUED | OUTPATIENT
Start: 2020-06-30 | End: 2020-06-30 | Stop reason: HOSPADM

## 2020-06-29 RX ORDER — PROMETHAZINE HYDROCHLORIDE 25 MG/1
25 TABLET ORAL ONCE AS NEEDED
Status: DISCONTINUED | OUTPATIENT
Start: 2020-06-29 | End: 2020-06-29 | Stop reason: HOSPADM

## 2020-06-29 RX ORDER — ONDANSETRON 2 MG/ML
4 INJECTION INTRAMUSCULAR; INTRAVENOUS EVERY 6 HOURS PRN
Status: DISCONTINUED | OUTPATIENT
Start: 2020-06-29 | End: 2020-06-30 | Stop reason: HOSPADM

## 2020-06-29 RX ORDER — POLYETHYLENE GLYCOL 3350 17 G/17G
17 POWDER, FOR SOLUTION ORAL 2 TIMES DAILY
Qty: 510 G | Refills: 0 | Status: SHIPPED | OUTPATIENT
Start: 2020-06-29 | End: 2020-07-14

## 2020-06-29 RX ORDER — LIDOCAINE HYDROCHLORIDE 10 MG/ML
0.5 INJECTION, SOLUTION EPIDURAL; INFILTRATION; INTRACAUDAL; PERINEURAL ONCE AS NEEDED
Status: DISCONTINUED | OUTPATIENT
Start: 2020-06-29 | End: 2020-06-29 | Stop reason: HOSPADM

## 2020-06-29 RX ORDER — KETOROLAC TROMETHAMINE 30 MG/ML
30 INJECTION, SOLUTION INTRAMUSCULAR; INTRAVENOUS EVERY 8 HOURS
Status: DISCONTINUED | OUTPATIENT
Start: 2020-06-29 | End: 2020-06-30 | Stop reason: HOSPADM

## 2020-06-29 RX ORDER — ASPIRIN 81 MG/1
81 TABLET ORAL EVERY 12 HOURS SCHEDULED
Qty: 60 TABLET | Refills: 0 | Status: SHIPPED | OUTPATIENT
Start: 2020-06-30 | End: 2020-07-30

## 2020-06-29 RX ORDER — MELOXICAM 15 MG/1
15 TABLET ORAL DAILY
Status: DISCONTINUED | OUTPATIENT
Start: 2020-06-30 | End: 2020-06-30 | Stop reason: HOSPADM

## 2020-06-29 RX ORDER — ONDANSETRON 2 MG/ML
4 INJECTION INTRAMUSCULAR; INTRAVENOUS ONCE AS NEEDED
Status: DISCONTINUED | OUTPATIENT
Start: 2020-06-29 | End: 2020-06-29 | Stop reason: HOSPADM

## 2020-06-29 RX ORDER — KETAMINE HYDROCHLORIDE 10 MG/ML
INJECTION INTRAMUSCULAR; INTRAVENOUS AS NEEDED
Status: DISCONTINUED | OUTPATIENT
Start: 2020-06-29 | End: 2020-06-29 | Stop reason: SURG

## 2020-06-29 RX ORDER — ROCURONIUM BROMIDE 10 MG/ML
INJECTION, SOLUTION INTRAVENOUS AS NEEDED
Status: DISCONTINUED | OUTPATIENT
Start: 2020-06-29 | End: 2020-06-29 | Stop reason: SURG

## 2020-06-29 RX ORDER — SODIUM CHLORIDE 0.9 % (FLUSH) 0.9 %
3-10 SYRINGE (ML) INJECTION AS NEEDED
Status: DISCONTINUED | OUTPATIENT
Start: 2020-06-29 | End: 2020-06-29 | Stop reason: HOSPADM

## 2020-06-29 RX ORDER — HYDROCODONE BITARTRATE AND ACETAMINOPHEN 7.5; 325 MG/1; MG/1
2 TABLET ORAL EVERY 4 HOURS PRN
Status: DISCONTINUED | OUTPATIENT
Start: 2020-06-29 | End: 2020-06-30 | Stop reason: HOSPADM

## 2020-06-29 RX ORDER — FENTANYL CITRATE 50 UG/ML
INJECTION, SOLUTION INTRAMUSCULAR; INTRAVENOUS AS NEEDED
Status: DISCONTINUED | OUTPATIENT
Start: 2020-06-29 | End: 2020-06-29 | Stop reason: SURG

## 2020-06-29 RX ORDER — ONDANSETRON 4 MG/1
4 TABLET, FILM COATED ORAL EVERY 6 HOURS PRN
Status: DISCONTINUED | OUTPATIENT
Start: 2020-06-29 | End: 2020-06-30 | Stop reason: HOSPADM

## 2020-06-29 RX ORDER — FAMOTIDINE 10 MG/ML
20 INJECTION, SOLUTION INTRAVENOUS ONCE
Status: COMPLETED | OUTPATIENT
Start: 2020-06-29 | End: 2020-06-29

## 2020-06-29 RX ORDER — HYDROCODONE BITARTRATE AND ACETAMINOPHEN 7.5; 325 MG/1; MG/1
1-2 TABLET ORAL EVERY 6 HOURS PRN
Qty: 42 TABLET | Refills: 0 | Status: SHIPPED | OUTPATIENT
Start: 2020-06-29 | End: 2020-07-09 | Stop reason: SDUPTHER

## 2020-06-29 RX ORDER — LABETALOL HYDROCHLORIDE 5 MG/ML
5 INJECTION, SOLUTION INTRAVENOUS
Status: DISCONTINUED | OUTPATIENT
Start: 2020-06-29 | End: 2020-06-29 | Stop reason: HOSPADM

## 2020-06-29 RX ORDER — HYDROMORPHONE HCL 110MG/55ML
PATIENT CONTROLLED ANALGESIA SYRINGE INTRAVENOUS AS NEEDED
Status: DISCONTINUED | OUTPATIENT
Start: 2020-06-29 | End: 2020-06-29 | Stop reason: SURG

## 2020-06-29 RX ORDER — HYDRALAZINE HYDROCHLORIDE 20 MG/ML
5 INJECTION INTRAMUSCULAR; INTRAVENOUS
Status: DISCONTINUED | OUTPATIENT
Start: 2020-06-29 | End: 2020-06-29 | Stop reason: HOSPADM

## 2020-06-29 RX ORDER — CLINDAMYCIN PHOSPHATE 900 MG/50ML
900 INJECTION INTRAVENOUS ONCE
Status: COMPLETED | OUTPATIENT
Start: 2020-06-29 | End: 2020-06-29

## 2020-06-29 RX ORDER — PROMETHAZINE HYDROCHLORIDE 25 MG/ML
6.25 INJECTION, SOLUTION INTRAMUSCULAR; INTRAVENOUS
Status: DISCONTINUED | OUTPATIENT
Start: 2020-06-29 | End: 2020-06-29 | Stop reason: HOSPADM

## 2020-06-29 RX ORDER — LIDOCAINE HYDROCHLORIDE 20 MG/ML
INJECTION, SOLUTION INFILTRATION; PERINEURAL AS NEEDED
Status: DISCONTINUED | OUTPATIENT
Start: 2020-06-29 | End: 2020-06-29 | Stop reason: SURG

## 2020-06-29 RX ORDER — PREGABALIN 75 MG/1
150 CAPSULE ORAL ONCE
Status: COMPLETED | OUTPATIENT
Start: 2020-06-29 | End: 2020-06-29

## 2020-06-29 RX ORDER — SCOLOPAMINE TRANSDERMAL SYSTEM 1 MG/1
1 PATCH, EXTENDED RELEASE TRANSDERMAL ONCE
Status: DISCONTINUED | OUTPATIENT
Start: 2020-06-29 | End: 2020-06-29

## 2020-06-29 RX ORDER — UREA 10 %
3 LOTION (ML) TOPICAL NIGHTLY PRN
Status: DISCONTINUED | OUTPATIENT
Start: 2020-06-29 | End: 2020-06-30 | Stop reason: HOSPADM

## 2020-06-29 RX ORDER — PROMETHAZINE HYDROCHLORIDE 25 MG/1
25 SUPPOSITORY RECTAL ONCE AS NEEDED
Status: DISCONTINUED | OUTPATIENT
Start: 2020-06-29 | End: 2020-06-29 | Stop reason: HOSPADM

## 2020-06-29 RX ORDER — HYDROCODONE BITARTRATE AND ACETAMINOPHEN 7.5; 325 MG/1; MG/1
1 TABLET ORAL EVERY 4 HOURS PRN
Status: DISCONTINUED | OUTPATIENT
Start: 2020-06-29 | End: 2020-06-30 | Stop reason: HOSPADM

## 2020-06-29 RX ORDER — MELOXICAM 15 MG/1
15 TABLET ORAL ONCE
Status: COMPLETED | OUTPATIENT
Start: 2020-06-29 | End: 2020-06-29

## 2020-06-29 RX ORDER — ACETAMINOPHEN 325 MG/1
650 TABLET ORAL ONCE AS NEEDED
Status: DISCONTINUED | OUTPATIENT
Start: 2020-06-29 | End: 2020-06-29 | Stop reason: HOSPADM

## 2020-06-29 RX ORDER — NALOXONE HCL 0.4 MG/ML
0.2 VIAL (ML) INJECTION AS NEEDED
Status: DISCONTINUED | OUTPATIENT
Start: 2020-06-29 | End: 2020-06-29 | Stop reason: HOSPADM

## 2020-06-29 RX ORDER — ONDANSETRON 2 MG/ML
INJECTION INTRAMUSCULAR; INTRAVENOUS AS NEEDED
Status: DISCONTINUED | OUTPATIENT
Start: 2020-06-29 | End: 2020-06-29 | Stop reason: SURG

## 2020-06-29 RX ORDER — DEXAMETHASONE SODIUM PHOSPHATE 10 MG/ML
INJECTION INTRAMUSCULAR; INTRAVENOUS AS NEEDED
Status: DISCONTINUED | OUTPATIENT
Start: 2020-06-29 | End: 2020-06-29 | Stop reason: SURG

## 2020-06-29 RX ADMIN — KETAMINE HYDROCHLORIDE 25 MG: 10 INJECTION INTRAMUSCULAR; INTRAVENOUS at 08:34

## 2020-06-29 RX ADMIN — PREGABALIN 150 MG: 75 CAPSULE ORAL at 07:58

## 2020-06-29 RX ADMIN — CEFAZOLIN SODIUM 2 G: 2 INJECTION, SOLUTION INTRAVENOUS at 16:11

## 2020-06-29 RX ADMIN — MUPIROCIN 1 APPLICATION: 20 OINTMENT TOPICAL at 20:53

## 2020-06-29 RX ADMIN — MIDAZOLAM 1 MG: 1 INJECTION INTRAMUSCULAR; INTRAVENOUS at 08:01

## 2020-06-29 RX ADMIN — HYDROMORPHONE HYDROCHLORIDE 0.5 MG: 2 INJECTION, SOLUTION INTRAMUSCULAR; INTRAVENOUS; SUBCUTANEOUS at 09:18

## 2020-06-29 RX ADMIN — ACETAMINOPHEN 1000 MG: 10 INJECTION, SOLUTION INTRAVENOUS at 08:42

## 2020-06-29 RX ADMIN — VANCOMYCIN HYDROCHLORIDE 1000 MG: 1 INJECTION, SOLUTION INTRAVENOUS at 08:02

## 2020-06-29 RX ADMIN — FAMOTIDINE 20 MG: 10 INJECTION, SOLUTION INTRAVENOUS at 08:00

## 2020-06-29 RX ADMIN — GLYCOPYRROLATE 0.4 MG: 0.2 INJECTION INTRAMUSCULAR; INTRAVENOUS at 09:10

## 2020-06-29 RX ADMIN — HYDROCODONE BITARTRATE AND ACETAMINOPHEN 1 TABLET: 7.5; 325 TABLET ORAL at 13:07

## 2020-06-29 RX ADMIN — ONDANSETRON 4 MG: 2 INJECTION INTRAMUSCULAR; INTRAVENOUS at 17:47

## 2020-06-29 RX ADMIN — GLYCOPYRROLATE 0.6 MG: 0.2 INJECTION INTRAMUSCULAR; INTRAVENOUS at 10:11

## 2020-06-29 RX ADMIN — HYDROCODONE BITARTRATE AND ACETAMINOPHEN 2 TABLET: 7.5; 325 TABLET ORAL at 18:46

## 2020-06-29 RX ADMIN — KETOROLAC TROMETHAMINE 30 MG: 30 INJECTION, SOLUTION INTRAMUSCULAR at 18:07

## 2020-06-29 RX ADMIN — LIDOCAINE HYDROCHLORIDE 100 MG: 20 INJECTION, SOLUTION INFILTRATION; PERINEURAL at 08:34

## 2020-06-29 RX ADMIN — POLYETHYLENE GLYCOL 3350 17 G: 17 POWDER, FOR SOLUTION ORAL at 20:53

## 2020-06-29 RX ADMIN — DEXAMETHASONE SODIUM PHOSPHATE 8 MG: 10 INJECTION INTRAMUSCULAR; INTRAVENOUS at 08:34

## 2020-06-29 RX ADMIN — NEOSTIGMINE METHYLSULFATE 4 MG: 1 INJECTION INTRAMUSCULAR; INTRAVENOUS; SUBCUTANEOUS at 10:11

## 2020-06-29 RX ADMIN — PROPOFOL 200 MG: 10 INJECTION, EMULSION INTRAVENOUS at 08:34

## 2020-06-29 RX ADMIN — TRANEXAMIC ACID 1000 MG: 100 INJECTION, SOLUTION INTRAVENOUS at 08:42

## 2020-06-29 RX ADMIN — PHENYLEPHRINE HYDROCHLORIDE 100 MCG: 10 INJECTION INTRAVENOUS at 09:47

## 2020-06-29 RX ADMIN — SODIUM CHLORIDE, POTASSIUM CHLORIDE, SODIUM LACTATE AND CALCIUM CHLORIDE: 600; 310; 30; 20 INJECTION, SOLUTION INTRAVENOUS at 10:02

## 2020-06-29 RX ADMIN — ONDANSETRON HYDROCHLORIDE 4 MG: 2 SOLUTION INTRAMUSCULAR; INTRAVENOUS at 09:14

## 2020-06-29 RX ADMIN — KETOROLAC TROMETHAMINE 30 MG: 30 INJECTION, SOLUTION INTRAMUSCULAR at 11:05

## 2020-06-29 RX ADMIN — EPHEDRINE SULFATE 15 MG: 50 INJECTION INTRAVENOUS at 09:11

## 2020-06-29 RX ADMIN — SODIUM CHLORIDE, POTASSIUM CHLORIDE, SODIUM LACTATE AND CALCIUM CHLORIDE 9 ML/HR: 600; 310; 30; 20 INJECTION, SOLUTION INTRAVENOUS at 07:58

## 2020-06-29 RX ADMIN — MELOXICAM 15 MG: 15 TABLET ORAL at 07:58

## 2020-06-29 RX ADMIN — TRANEXAMIC ACID 1000 MG: 100 INJECTION, SOLUTION INTRAVENOUS at 09:49

## 2020-06-29 RX ADMIN — FENTANYL CITRATE 50 MCG: 50 INJECTION, SOLUTION INTRAMUSCULAR; INTRAVENOUS at 11:08

## 2020-06-29 RX ADMIN — CLINDAMYCIN PHOSPHATE 900 MG: 18 INJECTION, SOLUTION INTRAMUSCULAR; INTRAVENOUS at 08:42

## 2020-06-29 RX ADMIN — ROCURONIUM BROMIDE 10 MG: 10 INJECTION, SOLUTION INTRAVENOUS at 09:12

## 2020-06-29 RX ADMIN — HYDROCODONE BITARTRATE AND ACETAMINOPHEN 1 TABLET: 7.5; 325 TABLET ORAL at 22:58

## 2020-06-29 RX ADMIN — FENTANYL CITRATE 100 MCG: 50 INJECTION INTRAMUSCULAR; INTRAVENOUS at 08:34

## 2020-06-29 RX ADMIN — ROCURONIUM BROMIDE 40 MG: 10 INJECTION, SOLUTION INTRAVENOUS at 08:34

## 2020-06-29 RX ADMIN — SCOPALAMINE 1 PATCH: 1 PATCH, EXTENDED RELEASE TRANSDERMAL at 08:00

## 2020-06-29 NOTE — ANESTHESIA PREPROCEDURE EVALUATION
Anesthesia Evaluation     Patient summary reviewed and Nursing notes reviewed   history of anesthetic complications: PONV  NPO Solid Status: > 8 hours  NPO Liquid Status: > 2 hours           Airway   Mallampati: II  TM distance: >3 FB  Neck ROM: full  No difficulty expected  Dental - normal exam     Pulmonary - negative pulmonary ROS and normal exam    breath sounds clear to auscultation  Cardiovascular - negative cardio ROS and normal exam    Rhythm: regular  Rate: normal        Neuro/Psych- negative ROS  (-) numbness  GI/Hepatic/Renal/Endo - negative ROS     Musculoskeletal     (+) radiculopathy Left lower extremity      ROS comment: DDD  Abdominal  - normal exam   Substance History - negative use     OB/GYN negative ob/gyn ROS         Other   arthritis,                      Anesthesia Plan    ASA 2     general     intravenous induction     Anesthetic plan, all risks, benefits, and alternatives have been provided, discussed and informed consent has been obtained with: patient.

## 2020-06-29 NOTE — ANESTHESIA PROCEDURE NOTES
Airway  Urgency: elective    Date/Time: 6/29/2020 8:38 AM  Airway not difficult    General Information and Staff    Patient location during procedure: OR  Anesthesiologist: Ender Oakley MD  CRNA: Safia Billings CRNA    Indications and Patient Condition  Indications for airway management: airway protection    Preoxygenated: yes  MILS maintained throughout  Mask difficulty assessment: 1 - vent by mask    Final Airway Details  Final airway type: endotracheal airway      Successful airway: ETT  Cuffed: yes   Successful intubation technique: direct laryngoscopy  Endotracheal tube insertion site: oral  Blade: Iglesias  Blade size: 2  ETT size (mm): 8.0  Cormack-Lehane Classification: grade I - full view of glottis  Placement verified by: chest auscultation and capnometry   Measured from: lips  Number of attempts at approach: 1  Assessment: lips, teeth, and gum same as pre-op and atraumatic intubation    Additional Comments  Atraumatic, Secured after verification of placement

## 2020-06-29 NOTE — ANESTHESIA POSTPROCEDURE EVALUATION
"Patient: Travis Taylor    Procedure Summary     Date:  06/29/20 Room / Location:  [facility] OR [address] [place] [address] MAIN OR    Anesthesia Start:  0830 Anesthesia Stop:  1036    Procedure:  TOTAL HIP ARTHROPLASTY ANTERIOR WITH HANA TABLE (Left Hip) Diagnosis:       Primary osteoarthritis of left hip      (Primary osteoarthritis of left hip [M16.12])    Surgeon:  Jace Montague MD Provider:  Ender Oakley MD    Anesthesia Type:  general ASA Status:  2          Anesthesia Type: general    Vitals  Vitals Value Taken Time   /66 6/29/2020 11:05 AM   Temp 36.5 °C (97.7 °F) 6/29/2020 10:34 AM   Pulse 72 6/29/2020 11:12 AM   Resp 16 6/29/2020 10:35 AM   SpO2 100 % 6/29/2020 11:12 AM   Vitals shown include unvalidated device data.        Post Anesthesia Care and Evaluation    Patient location during evaluation: bedside  Patient participation: complete - patient participated  Level of consciousness: awake and alert  Pain management: adequate  Airway patency: patent  Anesthetic complications: No anesthetic complications  PONV Status: none  Cardiovascular status: acceptable  Respiratory status: acceptable  Hydration status: acceptable    Comments: /64 (BP Location: Left arm, Patient Position: Lying)   Pulse 87   Temp 36.5 °C (97.7 °F) (Oral)   Resp 16   Ht 167.6 cm (66\")   Wt 69.3 kg (152 lb 12.5 oz)   SpO2 99%   BMI 24.66 kg/m²         "

## 2020-06-30 ENCOUNTER — READMISSION MANAGEMENT (OUTPATIENT)
Dept: CALL CENTER | Facility: HOSPITAL | Age: 60
End: 2020-06-30

## 2020-06-30 VITALS
BODY MASS INDEX: 24.55 KG/M2 | RESPIRATION RATE: 18 BRPM | TEMPERATURE: 97.5 F | DIASTOLIC BLOOD PRESSURE: 58 MMHG | WEIGHT: 152.78 LBS | SYSTOLIC BLOOD PRESSURE: 96 MMHG | HEIGHT: 66 IN | HEART RATE: 63 BPM | OXYGEN SATURATION: 97 %

## 2020-06-30 LAB
HCT VFR BLD AUTO: 36.9 % (ref 37.5–51)
HGB BLD-MCNC: 12.5 G/DL (ref 13–17.7)

## 2020-06-30 PROCEDURE — G0378 HOSPITAL OBSERVATION PER HR: HCPCS

## 2020-06-30 PROCEDURE — 85014 HEMATOCRIT: CPT | Performed by: ORTHOPAEDIC SURGERY

## 2020-06-30 PROCEDURE — 25010000003 CEFAZOLIN IN DEXTROSE 2-4 GM/100ML-% SOLUTION: Performed by: ORTHOPAEDIC SURGERY

## 2020-06-30 PROCEDURE — 25010000002 KETOROLAC TROMETHAMINE PER 15 MG: Performed by: ORTHOPAEDIC SURGERY

## 2020-06-30 PROCEDURE — 85018 HEMOGLOBIN: CPT | Performed by: ORTHOPAEDIC SURGERY

## 2020-06-30 PROCEDURE — 97110 THERAPEUTIC EXERCISES: CPT

## 2020-06-30 RX ADMIN — CEFAZOLIN SODIUM 2 G: 2 INJECTION, SOLUTION INTRAVENOUS at 00:13

## 2020-06-30 RX ADMIN — MELOXICAM 15 MG: 15 TABLET ORAL at 08:33

## 2020-06-30 RX ADMIN — HYDROCODONE BITARTRATE AND ACETAMINOPHEN 2 TABLET: 7.5; 325 TABLET ORAL at 06:18

## 2020-06-30 RX ADMIN — ASPIRIN 81 MG: 81 TABLET, COATED ORAL at 08:33

## 2020-06-30 RX ADMIN — KETOROLAC TROMETHAMINE 30 MG: 30 INJECTION, SOLUTION INTRAMUSCULAR at 02:37

## 2020-06-30 RX ADMIN — MUPIROCIN 1 APPLICATION: 20 OINTMENT TOPICAL at 08:33

## 2020-06-30 RX ADMIN — HYDROCODONE BITARTRATE AND ACETAMINOPHEN 1 TABLET: 7.5; 325 TABLET ORAL at 10:31

## 2020-06-30 RX ADMIN — PANTOPRAZOLE SODIUM 40 MG: 40 TABLET, DELAYED RELEASE ORAL at 06:16

## 2020-06-30 RX ADMIN — HYDROCODONE BITARTRATE AND ACETAMINOPHEN 1 TABLET: 7.5; 325 TABLET ORAL at 02:45

## 2020-06-30 RX ADMIN — POLYETHYLENE GLYCOL 3350 17 G: 17 POWDER, FOR SOLUTION ORAL at 08:33

## 2020-07-01 ENCOUNTER — TRANSITIONAL CARE MANAGEMENT TELEPHONE ENCOUNTER (OUTPATIENT)
Dept: CALL CENTER | Facility: HOSPITAL | Age: 60
End: 2020-07-01

## 2020-07-01 NOTE — OUTREACH NOTE
Call Center TCM Note      Responses   [facility] patient discharged from?  [place]   Does the patient have one of the following disease processes/diagnoses(primary or secondary)?  Total Joint Replacement   Joint surgery performed?  Hip   TCM attempt successful?  No   Unsuccessful attempts  Attempt 2          Candace Barnhart MA    7/1/2020, 16:18

## 2020-07-01 NOTE — OUTREACH NOTE
Call Center TCM Note      Responses   [facility] patient discharged from?  [place]   Does the patient have one of the following disease processes/diagnoses(primary or secondary)?  Total Joint Replacement   Joint surgery performed?  Hip   TCM attempt successful?  No   Unsuccessful attempts  Attempt 1   Call Status  Rescheduled          Jennifer Ely LPN    7/1/2020, 10:43

## 2020-07-02 ENCOUNTER — TRANSITIONAL CARE MANAGEMENT TELEPHONE ENCOUNTER (OUTPATIENT)
Dept: CALL CENTER | Facility: HOSPITAL | Age: 60
End: 2020-07-02

## 2020-07-02 NOTE — OUTREACH NOTE
Call Center TCM Note      Responses   [facility] patient discharged from?  [place]   Does the patient have one of the following disease processes/diagnoses(primary or secondary)?  Total Joint Replacement   TCM attempt successful?  No   Unsuccessful attempts  Attempt 3          Laila Lau RN    7/2/2020, 14:41

## 2020-07-06 ENCOUNTER — TELEPHONE (OUTPATIENT)
Dept: ORTHOPEDIC SURGERY | Facility: CLINIC | Age: 60
End: 2020-07-06

## 2020-07-06 NOTE — TELEPHONE ENCOUNTER
Patient wants to know how long he will have to take the Meloxicam and Hydrocodone medications. Also wants to know if it's ok for him to shave.

## 2020-07-06 NOTE — TELEPHONE ENCOUNTER
Patient states he was not sent home with meloxicam and he is taking what he already had at home.   He will take the Meloxicam until his visit w/[facility] next week.

## 2020-07-08 ENCOUNTER — TELEPHONE (OUTPATIENT)
Dept: ORTHOPEDIC SURGERY | Facility: CLINIC | Age: 60
End: 2020-07-08

## 2020-07-08 DIAGNOSIS — Z96.642 HISTORY OF LEFT HIP REPLACEMENT: Primary | ICD-10-CM

## 2020-07-08 DIAGNOSIS — M16.12 PRIMARY OSTEOARTHRITIS OF LEFT HIP: ICD-10-CM

## 2020-07-08 NOTE — TELEPHONE ENCOUNTER
Karen with [facility] needs a PT order entered in [facility] following patient's LEFT HIP SX 6/29. Karen can be reached at [phone]. Thanks /srh

## 2020-07-09 DIAGNOSIS — M16.12 PRIMARY OSTEOARTHRITIS OF LEFT HIP: ICD-10-CM

## 2020-07-10 RX ORDER — HYDROCODONE BITARTRATE AND ACETAMINOPHEN 7.5; 325 MG/1; MG/1
1-2 TABLET ORAL EVERY 6 HOURS PRN
Qty: 42 TABLET | Refills: 0 | Status: SHIPPED | OUTPATIENT
Start: 2020-07-10 | End: 2020-11-10

## 2020-07-14 ENCOUNTER — OFFICE VISIT (OUTPATIENT)
Dept: ORTHOPEDIC SURGERY | Facility: CLINIC | Age: 60
End: 2020-07-14

## 2020-07-14 VITALS — TEMPERATURE: 98.4 F | BODY MASS INDEX: 24.91 KG/M2 | WEIGHT: 155 LBS | HEIGHT: 66 IN

## 2020-07-14 DIAGNOSIS — Z96.642 HISTORY OF LEFT HIP REPLACEMENT: Primary | ICD-10-CM

## 2020-07-14 PROCEDURE — 99024 POSTOP FOLLOW-UP VISIT: CPT | Performed by: ORTHOPAEDIC SURGERY

## 2020-07-14 PROCEDURE — 73502 X-RAY EXAM HIP UNI 2-3 VIEWS: CPT | Performed by: ORTHOPAEDIC SURGERY

## 2020-07-14 NOTE — DISCHARGE SUMMARY
Patient Name: Travis Taylor  Patient YOB: 1960    Date of Admission:  6/29/2020  Date of Discharge:  6/30/2020  Discharge Diagnosis: TOTAL HIP ARTHROPLASTY ANTERIOR WITH HANA TABLE  Presenting Problem/History of Present Illness: Primary osteoarthritis of left hip [M16.12]  OA (osteoarthritis) of hip [M16.9]  Primary osteoarthritis of left hip [M16.12]  Admitting Physician: Dr Jace Montague  Consults:   Consults     No orders found from 5/31/2020 to 6/30/2020.          DETAILS OF HOSPITAL STAY:  Patient is a 59 y.o. male was admitted to the floor following the above procedure and underwent an uncomplicated hospital stay.  Patient did well with physical therapy and was ambulating well without problems.  On the day of discharge the wound was clean, dry and intact and calf was soft and non tender and Homans sign was negative.  Patient was tolerating  without problems.  Patient will be discharged home.    Condition on Discharge:  Stable    Vital Signs       LABS:   Admission on 06/29/2020, Discharged on 06/30/2020   Component Date Value Ref Range Status   • ABO Type 06/29/2020 O   Final   • RH type 06/29/2020 Positive   Final   • Antibody Screen 06/29/2020 Negative   Final   • T&S Expiration Date 06/29/2020 7/2/2020 11:59:59 PM   Final   • Hemoglobin 06/30/2020 12.5* 13.0 - 17.7 g/dL Final   • Hematocrit 06/30/2020 36.9* 37.5 - 51.0 % Final       No results found.        Discharge Medications     Discharge Medications      New Medications      Instructions Start Date   Aspirin Low Dose 81 MG EC tablet  Generic drug:  aspirin  Notes to patient:  Next dose due: tonight   81 mg, Oral, Every 12 Hours Scheduled      ClearLax 17 GM/SCOOP powder  Generic drug:  polyethylene glycol  Notes to patient:  Next dose due: tonight   17 g, Oral, 2 Times Daily      ondansetron 4 MG tablet  Commonly known as:  ZOFRAN  Notes to patient:  Next dose available at any time   4 mg, Oral, Every 6 Hours PRN         Changes to  Medications      Instructions Start Date   meloxicam 15 MG tablet  Commonly known as:  MOBIC  What changed:    · when to take this  · reasons to take this  · additional instructions  Notes to patient:  Next dose due: tomorrow   TAKE ONE TABLET BY MOUTH DAILY         Stop These Medications    ANTIOXIDANT PO     Calcium Citrate + D3 200-250 MG-UNIT tablet tablet  Generic drug:  calcium citrate-vitamin d     Chlorhexidine Gluconate 2 % pads     multivitamin tablet     mupirocin 2 % nasal ointment  Commonly known as:  BACTROBAN     vitamin B-12 1000 MCG tablet  Commonly known as:  CYANOCOBALAMIN     vitamin C 500 MG tablet  Commonly known as:  ASCORBIC ACID     Zinc 50 MG capsule        ASK your doctor about these medications      Instructions Start Date   pantoprazole 40 MG EC tablet  Commonly known as:  PROTONIX  Ask about: Should I take this medication?   40 mg, Oral, Daily             Activity at Discharge:     Discharge Instructions:   1)  Patient is to continue with physical therapy exercises daily and continue working with the physical therapist as ordered.  2)  Follow NO hip precautions.   3)  Patient may weight bear as tolerated.   4)  Apply ice regularly. You may ice for long periods of time as long as ice is not directly on the skin. Patient instructed on frequent calf pumping exercises.  Patient also instructed on incentive spirometer during hospitalization and encouraged to continue to use at home regularly.   5)  The dressing should be left in place. If waterproof dressing is intact the patient may shower immediately following discharge. If the dressing becomes disloged or saturated it should be changed. Please refer to the [facility] information sheet if you have any questions about the dressing.  If you have a home health nurse or therapist they can be contacted to assist with dressing change or repair. You may also call the [facility] dressing hotline for questions related to the dressing ([phone]). If there  still other problems or questions related to the dressing despite these measures then you can contact Brandi at our office [phone].   6)  Follow up appointment in 2 weeks - patient to call the office at [phone] to schedule. 7)  Patient will be discharged on aspirin 81mg BID x weeks, then daily x 4weeks    Complete Discharge Diagnosis:    Patient Active Problem List   Diagnosis   • Left lumbar radiculopathy   • Leukocytopenia   • Iron deficiency anemia   • Pain in right foot   • Right foot strain   • OA (osteoarthritis) of hip   • Primary osteoarthritis of left hip           Follow-up Appointments  Future Appointments   Date Time Provider Department Center   7/14/2020  1:10 PM Suzy Montague MD [facility] None   7/16/2020  8:00 AM Vira Rachel, PT [facility]   11/2/2020  9:30 AM [facility] None   11/10/2020  9:00 AM Dawna Lima MD [facility] None     Additional Instructions for the Follow-ups that You Need to Schedule     Referral to home health   As directed      Anterior hip replacement - no hip precautions    Order Comments:  Anterior hip replacement - no hip precautions     Face to Face Visit Date:  6/29/2020    Follow-up provider for Plan of Care?:  I will be treating the patient on an ongoing basis.  Please send me the Plan of Care for signature.    Follow-up provider:  SUZY MONTAGUE [[id number]]    Reason/Clinical Findings:  post op total hip replacement    Describe mobility limitations that make leaving home difficult:  pain, swelling, weakness, difficulty ambulating without assistive decices    Nursing/Therapeutic Services Requested:  Physicial Therapy                    Suzy Montague MD  07/14/20  13:03

## 2020-07-14 NOTE — PROGRESS NOTES
Travis Taylor : 1960 MRN: [id number] DATE: 2020    DIAGNOSIS: 2 week follow up left total hip     SUBJECTIVE:Patient returns today for 2 week follow up of left total hip replacement. Patient reports doing well with no unusual complaints. Appears to be progressing appropriately.    OBJECTIVE:   Exam:. The incision is healing appropriately. No sign of infection. Range of motion is progressing as expected. The calf is soft and nontender with a negative Homans sign.    DIAGNOSTIC STUDIES  Xrays: 2 views of the left hip (AP pelvis and lateral left hip) were ordered and reviewed for evaluation of recent hip replacement. They demonstrate a well positioned, well aligned hip replacement without complicating factors noted. In comparison with previous films there has been interval implant placement.    ASSESSMENT: 2 week status post left hip replacement.    PLAN: 1) Staples removed and steri strips applied   2) PT exercises   3) Discontinue SERJIO hose   4) Continue ice PRN   5) WBAT   6) aspirin 81 mg orally every day for 1 month   7) Follow up in 6 weeks with repeat Xrays of left hip (2views)    Jace Montague MD  2020

## 2020-07-16 ENCOUNTER — TREATMENT (OUTPATIENT)
Dept: PHYSICAL THERAPY | Facility: CLINIC | Age: 60
End: 2020-07-16

## 2020-07-16 DIAGNOSIS — Z74.09 IMPAIRED FUNCTIONAL MOBILITY, BALANCE, GAIT, AND ENDURANCE: ICD-10-CM

## 2020-07-16 DIAGNOSIS — Z96.642 STATUS POST TOTAL HIP REPLACEMENT, LEFT: Primary | ICD-10-CM

## 2020-07-16 DIAGNOSIS — G89.18 POST-OP PAIN: ICD-10-CM

## 2020-07-16 PROCEDURE — 97161 PT EVAL LOW COMPLEX 20 MIN: CPT | Performed by: PHYSICAL THERAPIST

## 2020-07-16 PROCEDURE — 97110 THERAPEUTIC EXERCISES: CPT | Performed by: PHYSICAL THERAPIST

## 2020-07-16 NOTE — PROGRESS NOTES
Physical Therapy Initial Evaluation and Plan of Care      Patient: Travis Taylor   : 1960  Diagnosis/ICD-10 Code:  Status post total hip replacement, left [Z96.642]  Referring practitioner: Jace Montague MD  Date of Initial Visit: 2020  Today's Date: 2020  Patient seen for 1 sessions           Subjective Questionnaire: LEFS: 36      Subjective Evaluation    History of Present Illness  Mechanism of injury: S/p L hip DUSTIN 20, 5 home health visits, taking 1 pain pill per day, rates 3-4/10. Using stairs in home and walking 0.5 miles, 2x/day. L hip mobility is still limited. Can't put sock on L. Trouble tying shoes. Has trouble with mini squats, reports some pain and resistance. Would like to be more comfortable on stairs, cut grass, hike. Using elevated toilet seat.  Wears R sided heel lift in shoe. Does not feel uneven.      Patient Occupation: Retired Pain  At worst pain ratin  Location: L anterior hip, incision site, buttocks  Quality: dull ache  Relieving factors: medications and ice  Aggravating factors: ambulation, squatting and standing  Progression: improved    Social Support  Lives in: multiple-level home  Lives with: spouse    Treatments  Previous treatment: physical therapy  Current treatment: physical therapy  Discharged from (in last 30 days): home health care  Patient Goals  Patient goals for therapy: decreased pain, improved balance, increased motion, increased strength, independence with ADLs/IADLs and return to sport/leisure activities                    Assessment & Plan     Assessment  Impairments: abnormal gait, abnormal muscle tone, abnormal or restricted ROM, activity intolerance, impaired physical strength, lacks appropriate home exercise program and pain with function  Assessment details: Pt presents s/p L DUSTIN 20 w/ limited mobility, slight limitations in strength, and tendency to favor L LE w/ functional activity. Will benefit from skilled PT services in order to  address listed impairments and increase tolerance to normal daily activities including ADLs and recreational activities.    Prognosis: good  Functional Limitations: lifting, walking, uncomfortable because of pain, standing and stooping  Goals  Plan Goals: Plan Goals: Short Term Goals: 2 weeks. Patient will:  1. Be independent with initial HEP  2. Be instructed in posture and body mechanics  3. Tolerate CKC LE strengthening w/o significant increase in pain    Long Term Goals: 6-8 weeks. Patient will:  1. Ambulate 1 mile w/o significant pain, limitation, or deviation in gait mechanics  2. Ascend/descend 4 stairs using reciprocal steps safely  3. Perform body weight squat w/o significant knee valgus or asymetry, demonstrating adequate strength for ADLs  4. Demonstrate normal lower extremity flexibility to allow for walking/ADL's/performance of household tasks without pain.  5. Perceived disability </= 15% as measured on LEFS  7. Be independent with long term HEP    Plan  Therapy options: will be seen for skilled physical therapy services  Planned modality interventions: cryotherapy, ultrasound, thermotherapy (hydrocollator packs) and electrical stimulation/Russian stimulation  Planned therapy interventions: abdominal trunk stabilization, balance/weight-bearing training, body mechanics training, ADL retraining, flexibility, functional ROM exercises, gait training, home exercise program, joint mobilization, manual therapy, neuromuscular re-education, soft tissue mobilization, strengthening, stretching and therapeutic activities  Frequency: 2x week  Duration in weeks: 12  Treatment plan discussed with: patient and family        Manual Therapy:    5     mins  55444;  Therapeutic Exercise:    25     mins  14841;     Neuromuscular Deanna:    0    mins  55382;    Therapeutic Activity:     0     mins  96264;     Gait Trainin     mins  50768;     Ultrasound:     0     mins  24499;    Electrical Stimulation:    0     mins   19002 ( );  Dry Needling     0     mins self-pay    Timed Treatment:   30   mins   Total Treatment:     60   mins    PT SIGNATURE: Vria Rachel, SEDRICK   DATE TREATMENT INITIATED: 7/16/2020    Initial Certification  Certification Period: 10/14/2020  I certify that the therapy services are furnished while this patient is under my care.  The services outlined above are required by this patient, and will be reviewed every 90 days.     PHYSICIAN: Jace Montague MD      DATE:     Please sign and return via fax to [phone].. Thank you, [facility].

## 2020-07-20 ENCOUNTER — TREATMENT (OUTPATIENT)
Dept: PHYSICAL THERAPY | Facility: CLINIC | Age: 60
End: 2020-07-20

## 2020-07-20 DIAGNOSIS — Z74.09 IMPAIRED FUNCTIONAL MOBILITY, BALANCE, GAIT, AND ENDURANCE: ICD-10-CM

## 2020-07-20 DIAGNOSIS — Z96.642 STATUS POST TOTAL HIP REPLACEMENT, LEFT: Primary | ICD-10-CM

## 2020-07-20 DIAGNOSIS — G89.18 POST-OP PAIN: ICD-10-CM

## 2020-07-20 PROCEDURE — 97110 THERAPEUTIC EXERCISES: CPT | Performed by: PHYSICAL THERAPIST

## 2020-07-20 PROCEDURE — 97530 THERAPEUTIC ACTIVITIES: CPT | Performed by: PHYSICAL THERAPIST

## 2020-07-20 NOTE — PROGRESS NOTES
Physical Therapy Daily Progress Note        Travis Claudia reports: stopped taking pain medication today, feeling a little more stiffness/tightness in left hip/joint.    Subjective     Objective   See Exercise, Manual, and Modality Logs for complete treatment.   Added bridge with ppt  Exercise rationale/ pain free exercise performance  Anatomy and structure of affected musculature  Sleeping positions with pillows  Alternate exercise positions  Verbal/Tactile cues to ensure correct exercise performance/technique      Assessment/Plan  Compliant/Cooperative with rehab efforts.  Subjectively reports no increased symptoms or discomfort with therapeutic exercise today, though some tightness/stiffness in hip.  Able to perform increased exercise/functional activity without increased left hip pain.  Fatigues easily with isolated ex/activities.  Benefits from verbal/tactile cues to ensure proper exercise technique/performance..      Progress strengthening /stabilization /functional activity as symptoms allow           Manual Therapy:       mins  18532;  Therapeutic Exercise:     23    mins  57313;     Neuromuscular Deanna:        mins  47245;    Therapeutic Activity:      12    mins  80614;     Gait Training:           mins  39258;     Ultrasound:          mins  74992;    Electrical Stimulation:         mins  26471 ( );      Timed Treatment:  45    mins   Total Treatment:   45     mins    El Sellers PTA    Physical Therapist Assistant [place] [id number]

## 2020-07-22 ENCOUNTER — TREATMENT (OUTPATIENT)
Dept: PHYSICAL THERAPY | Facility: CLINIC | Age: 60
End: 2020-07-22

## 2020-07-22 DIAGNOSIS — Z96.642 STATUS POST TOTAL HIP REPLACEMENT, LEFT: Primary | ICD-10-CM

## 2020-07-22 DIAGNOSIS — G89.18 POST-OP PAIN: ICD-10-CM

## 2020-07-22 DIAGNOSIS — Z74.09 IMPAIRED FUNCTIONAL MOBILITY, BALANCE, GAIT, AND ENDURANCE: ICD-10-CM

## 2020-07-22 PROCEDURE — 97140 MANUAL THERAPY 1/> REGIONS: CPT | Performed by: PHYSICAL THERAPIST

## 2020-07-22 PROCEDURE — 97110 THERAPEUTIC EXERCISES: CPT | Performed by: PHYSICAL THERAPIST

## 2020-07-22 NOTE — PROGRESS NOTES
Physical Therapy Daily Progress Note      Visit # 3      Subjective   Pt reports no new changes.  Objective          Passive Range of Motion   Left Hip   Flexion: 105 degrees   External rotation (90/90): 32 degrees     Strength/Myotome Testing     Left Hip   Planes of Motion   Flexion: 5  Extension: 5  Abduction: 5      See Exercise, Manual, and Modality Logs for complete treatment.       Assessment/Plan  Reassessed motion and strength due to error in recording at initial evaluation. Continues to have significantly limited hip ER and flexion. Excellent tolerance to manual interventions and exercise w/o c/o pain. Progress per POC.           Manual Therapy:    12     mins  28250;  Therapeutic Exercise:    23     mins  93531;     Neuromuscular Deanna:    0    mins  95808;    Therapeutic Activity:     0     mins  67684;     Gait Trainin     mins  03291;     Ultrasound:     0     mins  87781;    Work Hardening           0      mins 83583  Iontophoresis               0   mins 91556    Timed Treatment:   40   mins   Total Treatment:     47   mins    Vira Rachel, PT  Physical Therapist

## 2020-07-29 ENCOUNTER — TREATMENT (OUTPATIENT)
Dept: PHYSICAL THERAPY | Facility: CLINIC | Age: 60
End: 2020-07-29

## 2020-07-29 DIAGNOSIS — G89.18 POST-OP PAIN: ICD-10-CM

## 2020-07-29 DIAGNOSIS — Z74.09 IMPAIRED FUNCTIONAL MOBILITY, BALANCE, GAIT, AND ENDURANCE: ICD-10-CM

## 2020-07-29 DIAGNOSIS — Z96.642 STATUS POST TOTAL HIP REPLACEMENT, LEFT: Primary | ICD-10-CM

## 2020-07-29 PROCEDURE — 97530 THERAPEUTIC ACTIVITIES: CPT | Performed by: PHYSICAL THERAPIST

## 2020-07-29 PROCEDURE — 97110 THERAPEUTIC EXERCISES: CPT | Performed by: PHYSICAL THERAPIST

## 2020-07-29 NOTE — PROGRESS NOTES
"Physical Therapy Daily Progress Note      Visit # 4      Subjective   Pt reports his hip gets a little better each day. Less swollen and less painful. He's noticed slight increase in redness and hip is \"always hot\". Rates pain 3/10.     Objective   See Exercise, Manual, and Modality Logs for complete treatment.     Assessment/Plan  Excellent tolerance to exercise w/o c/o pain. Skin surrounding incision is pink, swollen. Reports of pain are decreasing. Pt denies discharge from incision site. Progress per POC.               Manual Therapy:    0     mins  31028;  Therapeutic Exercise:    15     mins  03954;     Neuromuscular Deanna:    0    mins  46331;    Therapeutic Activity:     30     mins  38602;     Gait Trainin     mins  74240;     Ultrasound:     0     mins  86058;    Work Hardening           0      mins 46139  Iontophoresis               0   mins 68252    Timed Treatment:   45   mins   Total Treatment:     55   mins    Vira Rachel, PT  Physical Therapist  "

## 2020-08-03 ENCOUNTER — TREATMENT (OUTPATIENT)
Dept: PHYSICAL THERAPY | Facility: CLINIC | Age: 60
End: 2020-08-03

## 2020-08-03 DIAGNOSIS — Z96.642 STATUS POST TOTAL HIP REPLACEMENT, LEFT: Primary | ICD-10-CM

## 2020-08-03 DIAGNOSIS — G89.18 POST-OP PAIN: ICD-10-CM

## 2020-08-03 DIAGNOSIS — Z74.09 IMPAIRED FUNCTIONAL MOBILITY, BALANCE, GAIT, AND ENDURANCE: ICD-10-CM

## 2020-08-03 PROCEDURE — 97110 THERAPEUTIC EXERCISES: CPT | Performed by: PHYSICAL THERAPIST

## 2020-08-03 PROCEDURE — 97530 THERAPEUTIC ACTIVITIES: CPT | Performed by: PHYSICAL THERAPIST

## 2020-08-03 NOTE — PROGRESS NOTES
Physical Therapy Daily Progress Note      Visit # 5      Subjective   Pt reports pain in left knee with standing hip abduction and mini squats. Both of these were exercises given by home health.    Objective   See Exercise, Manual, and Modality Logs for complete treatment.       Assessment/Plan  Required verbal cuing for proper form during standing hip abduction and squatting. Hip ROM continues to improve. Progress per POC.       Manual Therapy:    0     mins  78459;  Therapeutic Exercise:    35     mins  96617;     Neuromuscular Deanna:    0    mins  44068;    Therapeutic Activity:     10     mins  63665;     Gait Trainin     mins  18625;     Ultrasound:     0     mins  18399;    Work Hardening           0      mins 08381  Iontophoresis               0   mins 06160    Timed Treatment:   45   mins   Total Treatment:     50   mins    Vira Rachel, PT  Physical Therapist

## 2020-08-05 ENCOUNTER — TREATMENT (OUTPATIENT)
Dept: PHYSICAL THERAPY | Facility: CLINIC | Age: 60
End: 2020-08-05

## 2020-08-05 DIAGNOSIS — Z74.09 IMPAIRED FUNCTIONAL MOBILITY, BALANCE, GAIT, AND ENDURANCE: ICD-10-CM

## 2020-08-05 DIAGNOSIS — Z96.642 STATUS POST TOTAL HIP REPLACEMENT, LEFT: Primary | ICD-10-CM

## 2020-08-05 DIAGNOSIS — G89.18 POST-OP PAIN: ICD-10-CM

## 2020-08-05 PROCEDURE — 97110 THERAPEUTIC EXERCISES: CPT | Performed by: PHYSICAL THERAPIST

## 2020-08-05 PROCEDURE — 97140 MANUAL THERAPY 1/> REGIONS: CPT | Performed by: PHYSICAL THERAPIST

## 2020-08-05 NOTE — PROGRESS NOTES
Physical Therapy Daily Progress Note      Visit # 6      Subjective   Pt reports he was able to use push-mower and trim bushes w/o increased pain. Incision site is still a little red but swelling continues to go down.    Objective   See Exercise, Manual, and Modality Logs for complete treatment.       Assessment/Plan  Reports mild intermittent R knee pain w/ NuStep and leg press. L LE motion, strength, and stability continue to improve. Progress per POC.       Manual Therapy:    10     mins  63473;  Therapeutic Exercise:    30     mins  72825;     Neuromuscular Deanna:    0    mins  14397;    Therapeutic Activity:     0     mins  24005;     Gait Trainin     mins  39207;     Ultrasound:     0     mins  15286;    Work Hardening           0      mins 37988  Iontophoresis               0   mins 47354    Timed Treatment:   40   mins   Total Treatment:     45   mins    Vira Rachel, PT  Physical Therapist

## 2020-08-10 ENCOUNTER — TREATMENT (OUTPATIENT)
Dept: PHYSICAL THERAPY | Facility: CLINIC | Age: 60
End: 2020-08-10

## 2020-08-10 DIAGNOSIS — G89.18 POST-OP PAIN: ICD-10-CM

## 2020-08-10 DIAGNOSIS — Z74.09 IMPAIRED FUNCTIONAL MOBILITY, BALANCE, GAIT, AND ENDURANCE: ICD-10-CM

## 2020-08-10 DIAGNOSIS — Z96.642 STATUS POST TOTAL HIP REPLACEMENT, LEFT: Primary | ICD-10-CM

## 2020-08-10 PROCEDURE — 97140 MANUAL THERAPY 1/> REGIONS: CPT | Performed by: PHYSICAL THERAPIST

## 2020-08-10 PROCEDURE — 97530 THERAPEUTIC ACTIVITIES: CPT | Performed by: PHYSICAL THERAPIST

## 2020-08-10 PROCEDURE — 97110 THERAPEUTIC EXERCISES: CPT | Performed by: PHYSICAL THERAPIST

## 2020-08-10 NOTE — PROGRESS NOTES
Physical Therapy Daily Progress Note      Visit # 7      Subjective   Pt reports he was able to do all yard work this weekend w/o significant pain. He had some muscle soreness; right thigh was sore.    Objective   See Exercise, Manual, and Modality Logs for complete treatment.       Assessment/Plan  Reports discomfort in L lateral hip and knee with sidelying hip abduction. Demonstrated improved strength and form with squat, pain-free. L hip AROM continues to improve. Progress per POC.         Manual Therapy:    10     mins  91821;  Therapeutic Exercise:    20     mins  86810;     Neuromuscular Deanna:    0    mins  24339;    Therapeutic Activity:     10     mins  59375;     Gait Trainin     mins  13817;     Ultrasound:     0     mins  12400;    Work Hardening           0      mins 22956  Iontophoresis               0   mins 82651    Timed Treatment:   40   mins   Total Treatment:     50   mins    Vira Rachel, PT  Physical Therapist

## 2020-08-13 ENCOUNTER — TREATMENT (OUTPATIENT)
Dept: PHYSICAL THERAPY | Facility: CLINIC | Age: 60
End: 2020-08-13

## 2020-08-13 DIAGNOSIS — Z96.642 STATUS POST TOTAL HIP REPLACEMENT, LEFT: Primary | ICD-10-CM

## 2020-08-13 DIAGNOSIS — Z74.09 IMPAIRED FUNCTIONAL MOBILITY, BALANCE, GAIT, AND ENDURANCE: ICD-10-CM

## 2020-08-13 DIAGNOSIS — G89.18 POST-OP PAIN: ICD-10-CM

## 2020-08-13 PROCEDURE — 97140 MANUAL THERAPY 1/> REGIONS: CPT | Performed by: PHYSICAL THERAPIST

## 2020-08-13 PROCEDURE — 97110 THERAPEUTIC EXERCISES: CPT | Performed by: PHYSICAL THERAPIST

## 2020-08-13 PROCEDURE — 97530 THERAPEUTIC ACTIVITIES: CPT | Performed by: PHYSICAL THERAPIST

## 2020-08-13 NOTE — PROGRESS NOTES
Physical Therapy Daily Progress Note      Visit # 8      Subjective   Pt reports no major limitations w/ ADLs.    Objective   L hip AROM flexion 109deg    See Exercise, Manual, and Modality Logs for complete treatment.       Assessment/Plan  L hip motion continues to improve. Demonstrates safe lifting mechanics picking up 10lb object from floor. Added quad stretch to HEP to address anterior thigh/knee tightness. Progress per POC.         Manual Therapy:    8     mins  89635;  Therapeutic Exercise:    15     mins  45332;     Neuromuscular Deanna:    0    mins  56602;    Therapeutic Activity:     20     mins  73866;     Gait Trainin     mins  06452;     Ultrasound:     0     mins  98605;    Work Hardening           0      mins 32631  Iontophoresis               0   mins 03796    Timed Treatment:   43   mins   Total Treatment:     50   mins    Vira Rachel, PT  Physical Therapist

## 2020-08-17 ENCOUNTER — TREATMENT (OUTPATIENT)
Dept: PHYSICAL THERAPY | Facility: CLINIC | Age: 60
End: 2020-08-17

## 2020-08-17 DIAGNOSIS — G89.18 POST-OP PAIN: ICD-10-CM

## 2020-08-17 DIAGNOSIS — Z96.642 STATUS POST TOTAL HIP REPLACEMENT, LEFT: Primary | ICD-10-CM

## 2020-08-17 DIAGNOSIS — Z74.09 IMPAIRED FUNCTIONAL MOBILITY, BALANCE, GAIT, AND ENDURANCE: ICD-10-CM

## 2020-08-17 PROCEDURE — 97530 THERAPEUTIC ACTIVITIES: CPT | Performed by: PHYSICAL THERAPIST

## 2020-08-17 PROCEDURE — 97110 THERAPEUTIC EXERCISES: CPT | Performed by: PHYSICAL THERAPIST

## 2020-08-17 NOTE — PROGRESS NOTES
Physical Therapy Daily Progress Note      Visit # 9      Subjective   Pt reports he cut his entire lawn after PT Thursday, and as he finished, he felt pain in L lower leg (anterior) and buttocks. Pain felt similar to the pain he had pre-surgery.    Objective   See Exercise, Manual, and Modality Logs for complete treatment.       Assessment/Plan  Required verbal cues to avoid favoring with counter squat. Continues to report intermittent L knee pain with side lying hip abduction, counter squat, and leg press. Updated HEP to include sciatic nerve glide and piriformis stretch. Progress per POC.           Manual Therapy:    0     mins  16055;  Therapeutic Exercise:    25     mins  69558;     Neuromuscular Deanna:    0    mins  97578;    Therapeutic Activity:     15     mins  76848;     Gait Trainin     mins  27493;     Ultrasound:     0     mins  48836;    Work Hardening           0      mins 89819  Iontophoresis               0   mins 09113    Timed Treatment:   40   mins   Total Treatment:     50   mins    Vira Rachel, PT  Physical Therapist

## 2020-08-20 ENCOUNTER — TREATMENT (OUTPATIENT)
Dept: PHYSICAL THERAPY | Facility: CLINIC | Age: 60
End: 2020-08-20

## 2020-08-20 DIAGNOSIS — Z74.09 IMPAIRED FUNCTIONAL MOBILITY, BALANCE, GAIT, AND ENDURANCE: ICD-10-CM

## 2020-08-20 DIAGNOSIS — Z96.642 STATUS POST TOTAL HIP REPLACEMENT, LEFT: Primary | ICD-10-CM

## 2020-08-20 DIAGNOSIS — G89.18 POST-OP PAIN: ICD-10-CM

## 2020-08-20 PROCEDURE — 97110 THERAPEUTIC EXERCISES: CPT | Performed by: PHYSICAL THERAPIST

## 2020-08-20 NOTE — PROGRESS NOTES
Re-Assessment / Re-Certification        Patient: Travis Taylor   : 1960  Diagnosis/ICD-10 Code:  Status post total hip replacement, left [Z96.642]  Referring practitioner: Jace Montague MD  Date of Initial Evaluation:  Type: THERAPY  Noted: 2020  Patient seen for 10 sessions      Subjective:   Travis Taylor reports: Pt reports R knee and calf pain after cutting grass. On average, rates body pain 3/10. At times, body pain is as high as 7/10. Pain typically after doing yard work.  Subjective Questionnaire: LEFS: 59/80 vs 36/80 20  Clinical Progress: improved  Home Program Compliance: Yes  Treatment has included: therapeutic exercise, manual therapy, therapeutic activity and gait training    Subjective   Objective          Strength/Myotome Testing     Left Hip   Planes of Motion   Flexion: 5  Extension: 5  Abduction: 5      L hip AROM flexion 92           PROM flexion 105           PROM ER 35           PROM IR 22    Assessment/Plan  Progress toward previous goals: Partially Met    AROM and strength progressing well though activity tolerance is limited. Pt reports high pain (7/10) w/ ADLs such as yard work. Will continue to benefit from skilled PT to progress strength and endurance to allow for return to ADLs unlimited by pain..    Recommendations: Continue as planned  Timeframe: 1 month  Prognosis to achieve goals: good    PT Signature: Vira Rachel PT      Based upon review of the patient's progress and continued therapy plan, it is my medical opinion that Travis Taylor should continue physical therapy treatment at [facility]  [address] [place] [id number] [phone].    Signature: __________________________________  Jace Montague MD    Manual Therapy:    0     mins  97960;  Therapeutic Exercise:    40     mins  59634;     Neuromuscular Deanna:    0    mins  77690;    Therapeutic Activity:     0     mins  21059;     Gait Training:       0     mins  08290;     Ultrasound:     0     mins  54280;    Work Hardening           0      mins 34534  Iontophoresis               0   mins 40542    Timed Treatment:   40   mins   Total Treatment:     50   mins

## 2020-08-24 ENCOUNTER — TREATMENT (OUTPATIENT)
Dept: PHYSICAL THERAPY | Facility: CLINIC | Age: 60
End: 2020-08-24

## 2020-08-24 DIAGNOSIS — G89.18 POST-OP PAIN: ICD-10-CM

## 2020-08-24 DIAGNOSIS — Z74.09 IMPAIRED FUNCTIONAL MOBILITY, BALANCE, GAIT, AND ENDURANCE: ICD-10-CM

## 2020-08-24 DIAGNOSIS — Z96.642 STATUS POST TOTAL HIP REPLACEMENT, LEFT: Primary | ICD-10-CM

## 2020-08-24 PROCEDURE — 97530 THERAPEUTIC ACTIVITIES: CPT | Performed by: PHYSICAL THERAPIST

## 2020-08-24 PROCEDURE — 97140 MANUAL THERAPY 1/> REGIONS: CPT | Performed by: PHYSICAL THERAPIST

## 2020-08-24 PROCEDURE — 97110 THERAPEUTIC EXERCISES: CPT | Performed by: PHYSICAL THERAPIST

## 2020-08-24 NOTE — PROGRESS NOTES
Physical Therapy Daily Progress Note      Visit # 11      Subjective   Pt reports mild pain in R buttocks to calf this morning after washing his car. No significant L hip pain. Pt recalls imaging in the past (possibly ) that showed lumbar stenosis.    Objective   See Exercise, Manual, and Modality Logs for complete treatment.       Assessment/Plan  Increased tenderness of R piriformis. Passive release of R piriformis reproduced acute lower leg tingling. Tingling resolved when pressure was released. May benefit from further evaluation/testing to determine source of R LE pain.    L hip AROM and strength progressing well. Continues to have decreased L hip flexion compared to R. Tolerates manual interventions and exercise well. Progress per POC.           Manual Therapy:    15     mins  15347;  Therapeutic Exercise:    20     mins  57821;     Neuromuscular Deanna:    0    mins  93618;    Therapeutic Activity:     20     mins  63284;     Gait Trainin     mins  81539;     Ultrasound:     0     mins  36557;    Work Hardening           0      mins 80146  Iontophoresis               0   mins 48791    Timed Treatment:   55   mins   Total Treatment:     75   mins    Vira Rachel, PT  Physical Therapist

## 2020-08-25 ENCOUNTER — OFFICE VISIT (OUTPATIENT)
Dept: ORTHOPEDIC SURGERY | Facility: CLINIC | Age: 60
End: 2020-08-25

## 2020-08-25 VITALS — WEIGHT: 153 LBS | TEMPERATURE: 98.5 F | HEIGHT: 66 IN | BODY MASS INDEX: 24.59 KG/M2

## 2020-08-25 DIAGNOSIS — Z96.642 HISTORY OF LEFT HIP REPLACEMENT: Primary | ICD-10-CM

## 2020-08-25 PROCEDURE — 99024 POSTOP FOLLOW-UP VISIT: CPT | Performed by: NURSE PRACTITIONER

## 2020-08-25 PROCEDURE — 73502 X-RAY EXAM HIP UNI 2-3 VIEWS: CPT | Performed by: NURSE PRACTITIONER

## 2020-08-25 RX ORDER — ASCORBIC ACID 500 MG
500 TABLET ORAL DAILY
COMMUNITY

## 2020-08-25 RX ORDER — MULTIPLE VITAMINS W/ MINERALS TAB 9MG-400MCG
1 TAB ORAL DAILY
COMMUNITY

## 2020-08-25 NOTE — PROGRESS NOTES
Travis Taylor : 1960 MRN: [id number] DATE: 2020    DIAGNOSIS: 8 week follow up left total hip     SUBJECTIVE:Patient returns today for 8 week follow up of left total hip replacement. Patient reports doing well with no unusual complaints. Appears to be progressing appropriately and is off a cane    OBJECTIVE:   Exam:. The incision is healed. No sign of infection. Range of motion is progressing as expected. The calf is soft and nontender with a negative Homans sign. Strength progressing    DIAGNOSTIC STUDIES  Xrays: 2 views of the left hip (AP pelvis and lateral left hip) were ordered and reviewed for evaluation of recent hip replacement. They demonstrate a well positioned, well aligned hip replacement without complicating factors noted. In comparison with previous films there has been interval implant placement.    ASSESSMENT: 8 week status post left hip replacement.    PLAN: 1) Activity as tolerated   2) Continue hip strengthening exercises    3) Follow up 1 year post-op with repeat Xrays of left hip (2views AP Pelvis      and lateral left hip)    Meli Cardona, JANETTE  2020

## 2020-08-27 ENCOUNTER — TREATMENT (OUTPATIENT)
Dept: PHYSICAL THERAPY | Facility: CLINIC | Age: 60
End: 2020-08-27

## 2020-08-27 DIAGNOSIS — Z74.09 IMPAIRED FUNCTIONAL MOBILITY, BALANCE, GAIT, AND ENDURANCE: ICD-10-CM

## 2020-08-27 DIAGNOSIS — Z96.642 STATUS POST TOTAL HIP REPLACEMENT, LEFT: Primary | ICD-10-CM

## 2020-08-27 DIAGNOSIS — G89.18 POST-OP PAIN: ICD-10-CM

## 2020-08-27 PROCEDURE — 97110 THERAPEUTIC EXERCISES: CPT | Performed by: PHYSICAL THERAPIST

## 2020-08-27 PROCEDURE — 97140 MANUAL THERAPY 1/> REGIONS: CPT | Performed by: PHYSICAL THERAPIST

## 2020-08-27 NOTE — PROGRESS NOTES
"Physical Therapy Daily Progress Note      Visit # 12      Subjective   Pt reports he split yard work over the past few days and has felt pretty good.    Objective   See Exercise, Manual, and Modality Logs for complete treatment.       Assessment/Plan  No c/o pain in either leg today. Reported low back \"popping\" w/ R hip extension. \"Popping\" relieved with iliopsoas release. [place] pt new hip flexor stretch. Progress per POC.         Manual Therapy:    10     mins  20818;  Therapeutic Exercise:    35     mins  53490;     Neuromuscular Deanna:    0    mins  31323;    Therapeutic Activity:     0     mins  80875;     Gait Trainin     mins  28281;     Ultrasound:     0     mins  07812;    Work Hardening           0      mins 30502  Iontophoresis               0   mins 21423    Timed Treatment:   45   mins   Total Treatment:     50   mins    Vira Rachel, PT  Physical Therapist  "

## 2020-08-31 ENCOUNTER — TREATMENT (OUTPATIENT)
Dept: PHYSICAL THERAPY | Facility: CLINIC | Age: 60
End: 2020-08-31

## 2020-08-31 DIAGNOSIS — Z96.642 STATUS POST TOTAL HIP REPLACEMENT, LEFT: Primary | ICD-10-CM

## 2020-08-31 DIAGNOSIS — G89.18 POST-OP PAIN: ICD-10-CM

## 2020-08-31 DIAGNOSIS — Z74.09 IMPAIRED FUNCTIONAL MOBILITY, BALANCE, GAIT, AND ENDURANCE: ICD-10-CM

## 2020-08-31 PROCEDURE — 97140 MANUAL THERAPY 1/> REGIONS: CPT | Performed by: PHYSICAL THERAPIST

## 2020-08-31 PROCEDURE — 97110 THERAPEUTIC EXERCISES: CPT | Performed by: PHYSICAL THERAPIST

## 2020-08-31 PROCEDURE — 97530 THERAPEUTIC ACTIVITIES: CPT | Performed by: PHYSICAL THERAPIST

## 2020-08-31 NOTE — PROGRESS NOTES
Physical Therapy Daily Progress Note      Visit # 13      Subjective   Pt reports he walked 2 miles and cut grass this weekend. Pain is minimal. Had some R hip pain when driving 2 hours.    Objective   See Exercise, Manual, and Modality Logs for complete treatment.       Assessment/Plan  Subjective reports of pain minimal. Reports improved activity tolerance over the weekend. Taught patient how to perform exercises in seated position so he can do them at rest stops when traveling. Progress per POC.           Manual Therapy:    8     mins  96808;  Therapeutic Exercise:    32     mins  18872;     Neuromuscular Deanna:    0    mins  78667;    Therapeutic Activity:     20     mins  77487;     Gait Trainin     mins  49453;     Ultrasound:     0     mins  15921;    Work Hardening           0      mins 01954  Iontophoresis               0   mins 52057    Timed Treatment:   60   mins   Total Treatment:     75   mins    Vira Rachel, PT  Physical Therapist

## 2020-09-09 ENCOUNTER — TELEPHONE (OUTPATIENT)
Dept: ORTHOPEDIC SURGERY | Facility: CLINIC | Age: 60
End: 2020-09-09

## 2020-09-09 ENCOUNTER — TREATMENT (OUTPATIENT)
Dept: PHYSICAL THERAPY | Facility: CLINIC | Age: 60
End: 2020-09-09

## 2020-09-09 DIAGNOSIS — Z96.642 STATUS POST TOTAL HIP REPLACEMENT, LEFT: Primary | ICD-10-CM

## 2020-09-09 DIAGNOSIS — Z74.09 IMPAIRED FUNCTIONAL MOBILITY, BALANCE, GAIT, AND ENDURANCE: ICD-10-CM

## 2020-09-09 PROCEDURE — 97530 THERAPEUTIC ACTIVITIES: CPT | Performed by: PHYSICAL THERAPIST

## 2020-09-09 PROCEDURE — 97110 THERAPEUTIC EXERCISES: CPT | Performed by: PHYSICAL THERAPIST

## 2020-09-09 PROCEDURE — 97140 MANUAL THERAPY 1/> REGIONS: CPT | Performed by: PHYSICAL THERAPIST

## 2020-09-09 RX ORDER — CEPHALEXIN 500 MG/1
CAPSULE ORAL
Qty: 4 CAPSULE | Refills: 2 | Status: SHIPPED | OUTPATIENT
Start: 2020-09-09 | End: 2021-06-07

## 2020-09-09 NOTE — PROGRESS NOTES
"Physical Therapy Daily Progress Note      Visit # 14      Subjective   Pt reports he did a few hours of yard work per day over the weekend and did not have significant pain.    Objective   See Exercise, Manual, and Modality Logs for complete treatment.       Assessment/Plan  Subjective reports of pain are minimal. Excellent tolerance to strengthening w/o c/o pain. R hip \"clicking\" w/ core strengthening appears to be due to limited flexibility of R iliopsoas. Fair tolerance to soft tissue release. Anticipate DC next visit.               Manual Therapy:    10     mins  88823;  Therapeutic Exercise:    15     mins  29139;     Neuromuscular Deanna:    0    mins  57868;    Therapeutic Activity:     25     mins  42377;     Gait Trainin     mins  08845;     Ultrasound:     0     mins  08313;    Work Hardening           0      mins 77367  Iontophoresis               0   mins 17988    Timed Treatment:   50   mins   Total Treatment:     65   mins    Vira Rachel, PT  Physical Therapist  "

## 2020-09-09 NOTE — TELEPHONE ENCOUNTER
Left message for the patient that new prescription has been sent in to [facility] at [phone] for Keflex 500 mg, #4, directions are to take all 4 capsules 1 hour prior to his procedure.  Refill x2 per RBB.  Patient does list penicillin as an allergy however was given Kefzol in the hospital at the time of his surgery and tolerated that without any problem

## 2020-09-14 ENCOUNTER — TREATMENT (OUTPATIENT)
Dept: PHYSICAL THERAPY | Facility: CLINIC | Age: 60
End: 2020-09-14

## 2020-09-14 DIAGNOSIS — Z96.642 STATUS POST TOTAL HIP REPLACEMENT, LEFT: Primary | ICD-10-CM

## 2020-09-14 DIAGNOSIS — G89.18 POST-OP PAIN: ICD-10-CM

## 2020-09-14 DIAGNOSIS — Z74.09 IMPAIRED FUNCTIONAL MOBILITY, BALANCE, GAIT, AND ENDURANCE: ICD-10-CM

## 2020-09-14 PROCEDURE — 97530 THERAPEUTIC ACTIVITIES: CPT | Performed by: PHYSICAL THERAPIST

## 2020-09-14 PROCEDURE — 97140 MANUAL THERAPY 1/> REGIONS: CPT | Performed by: PHYSICAL THERAPIST

## 2020-09-14 PROCEDURE — 97110 THERAPEUTIC EXERCISES: CPT | Performed by: PHYSICAL THERAPIST

## 2020-09-14 NOTE — PROGRESS NOTES
Physical Therapy Daily Progress Note      Visit # 15      Subjective   Pt reports no limitations w/ ADLs. Went camping over the weekend and was able to sleep on the ground. Notices very little swelling near incision.    Right hip (buttocks) felt tight after driving around town running several errands.    Objective   See Exercise, Manual, and Modality Logs for complete treatment.       Assessment/Plan  Pt reports no pain or limitation w/ ADLs. Demonstrates independence with HEP for both hips. Will DC to HEP at this time.       Manual Therapy:    10     mins  29547;  Therapeutic Exercise:    20     mins  05070;     Neuromuscular Deanna:    0    mins  73554;    Therapeutic Activity:     10     mins  46810;     Gait Trainin     mins  91134;     Ultrasound:     0     mins  39182;    Work Hardening           0      mins 42599  Iontophoresis               0   mins 32014    Timed Treatment:   40   mins   Total Treatment:     55   mins    Vira Rachel, PT  Physical Therapist

## 2020-11-02 DIAGNOSIS — R53.83 FATIGUE, UNSPECIFIED TYPE: Primary | ICD-10-CM

## 2020-11-03 LAB
ALBUMIN SERPL-MCNC: 4.7 G/DL (ref 3.5–5.2)
ALBUMIN/GLOB SERPL: 2.8 G/DL
ALP SERPL-CCNC: 79 U/L (ref 39–117)
ALT SERPL-CCNC: 21 U/L (ref 1–41)
AST SERPL-CCNC: 14 U/L (ref 1–40)
BASOPHILS # BLD AUTO: 0.03 10*3/MM3 (ref 0–0.2)
BASOPHILS NFR BLD AUTO: 0.9 % (ref 0–1.5)
BILIRUB SERPL-MCNC: 0.3 MG/DL (ref 0–1.2)
BUN SERPL-MCNC: 16 MG/DL (ref 6–20)
BUN/CREAT SERPL: 15.7 (ref 7–25)
CALCIUM SERPL-MCNC: 9 MG/DL (ref 8.6–10.5)
CHLORIDE SERPL-SCNC: 105 MMOL/L (ref 98–107)
CHOLEST SERPL-MCNC: 201 MG/DL (ref 0–200)
CO2 SERPL-SCNC: 30.1 MMOL/L (ref 22–29)
CREAT SERPL-MCNC: 1.02 MG/DL (ref 0.76–1.27)
EOSINOPHIL # BLD AUTO: 0.08 10*3/MM3 (ref 0–0.4)
EOSINOPHIL NFR BLD AUTO: 2.5 % (ref 0.3–6.2)
ERYTHROCYTE [DISTWIDTH] IN BLOOD BY AUTOMATED COUNT: 13.1 % (ref 12.3–15.4)
GLOBULIN SER CALC-MCNC: 1.7 GM/DL
GLUCOSE SERPL-MCNC: 84 MG/DL (ref 65–99)
HCT VFR BLD AUTO: 49.4 % (ref 37.5–51)
HDLC SERPL-MCNC: 61 MG/DL (ref 40–60)
HGB BLD-MCNC: 16.4 G/DL (ref 13–17.7)
IMM GRANULOCYTES # BLD AUTO: 0.01 10*3/MM3 (ref 0–0.05)
IMM GRANULOCYTES NFR BLD AUTO: 0.3 % (ref 0–0.5)
LDLC SERPL CALC-MCNC: 128 MG/DL (ref 0–100)
LDLC/HDLC SERPL: 2.08 {RATIO}
LYMPHOCYTES # BLD AUTO: 0.58 10*3/MM3 (ref 0.7–3.1)
LYMPHOCYTES NFR BLD AUTO: 17.8 % (ref 19.6–45.3)
MCH RBC QN AUTO: 31.4 PG (ref 26.6–33)
MCHC RBC AUTO-ENTMCNC: 33.2 G/DL (ref 31.5–35.7)
MCV RBC AUTO: 94.5 FL (ref 79–97)
MONOCYTES # BLD AUTO: 0.33 10*3/MM3 (ref 0.1–0.9)
MONOCYTES NFR BLD AUTO: 10.1 % (ref 5–12)
NEUTROPHILS # BLD AUTO: 2.23 10*3/MM3 (ref 1.7–7)
NEUTROPHILS NFR BLD AUTO: 68.4 % (ref 42.7–76)
NRBC BLD AUTO-RTO: 0 /100 WBC (ref 0–0.2)
PLATELET # BLD AUTO: 268 10*3/MM3 (ref 140–450)
POTASSIUM SERPL-SCNC: 5.1 MMOL/L (ref 3.5–5.2)
PROT SERPL-MCNC: 6.4 G/DL (ref 6–8.5)
RBC # BLD AUTO: 5.23 10*6/MM3 (ref 4.14–5.8)
SODIUM SERPL-SCNC: 142 MMOL/L (ref 136–145)
TRIGL SERPL-MCNC: 65 MG/DL (ref 0–150)
TSH SERPL DL<=0.005 MIU/L-ACNC: 1.76 UIU/ML (ref 0.27–4.2)
VIT B12 SERPL-MCNC: 943 PG/ML (ref 211–946)
VLDLC SERPL CALC-MCNC: 12 MG/DL (ref 5–40)
WBC # BLD AUTO: 3.26 10*3/MM3 (ref 3.4–10.8)

## 2020-11-10 ENCOUNTER — OFFICE VISIT (OUTPATIENT)
Dept: INTERNAL MEDICINE | Facility: CLINIC | Age: 60
End: 2020-11-10

## 2020-11-10 VITALS
WEIGHT: 156.5 LBS | HEART RATE: 70 BPM | OXYGEN SATURATION: 98 % | BODY MASS INDEX: 25.15 KG/M2 | DIASTOLIC BLOOD PRESSURE: 68 MMHG | SYSTOLIC BLOOD PRESSURE: 94 MMHG | HEIGHT: 66 IN

## 2020-11-10 DIAGNOSIS — Z00.00 HEALTH CARE MAINTENANCE: Primary | ICD-10-CM

## 2020-11-10 DIAGNOSIS — M16.12 PRIMARY OSTEOARTHRITIS OF LEFT HIP: ICD-10-CM

## 2020-11-10 PROBLEM — M16.9 OA (OSTEOARTHRITIS) OF HIP: Status: RESOLVED | Noted: 2020-02-22 | Resolved: 2020-11-10

## 2020-11-10 PROCEDURE — 99396 PREV VISIT EST AGE 40-64: CPT | Performed by: INTERNAL MEDICINE

## 2020-11-10 NOTE — PROGRESS NOTES
"Subjective   Travis Taylor is a 60 y.o. male and is here for a comprehensive physical exam. The patient reports problems - s/p left hip replacement.    Doing well s/p sx pt back to reg activity      Do you take any herbs or supplements that were not prescribed by a doctor? See list      Social History:   Social History     Socioeconomic History   • Marital status:      Spouse name: JOSE   • Number of children: 2   • Years of education: College   • Highest education level: Not on file   Occupational History   • Occupation:      Employer: [employer]   Tobacco Use   • Smoking status: Never Smoker   • Smokeless tobacco: Never Used   Substance and Sexual Activity   • Alcohol use: Yes     Comment: socially   • Drug use: No   • Sexual activity: Defer   Social History Narrative    2 grown children       Family History:   Family History   Problem Relation Age of Onset   • Hypertension Mother    • Arthritis Mother    • Diabetes Mother    • Stroke Father 85   • Dementia Father    • Depression Father    • Lung cancer Paternal Grandfather         & possible mouth cancer; was a smoker.    • Malig Hyperthermia Neg Hx        Past Medical History:   Past Medical History:   Diagnosis Date   • DDD (degenerative disc disease), lumbar     L4-5   • Fatigue    • H/O Broken jaw (CMS/HCC) 1968   • History of concussion 1968   • Left hip pain    • Leukopenia 2018   • PONV (postoperative nausea and vomiting)            Review of Systems    A comprehensive review of systems was negative.    Objective   BP 94/68 (BP Location: Left arm, Patient Position: Sitting)   Pulse 70   Ht 167.6 cm (66\")   Wt 71 kg (156 lb 8 oz)   SpO2 98%   BMI 25.26 kg/m²     General Appearance:    Alert, cooperative, no distress, appears stated age   Head:    Normocephalic, without obvious abnormality, atraumatic   Eyes:    PERRL, conjunctiva/corneas clear, EOM's intact, fundi     benign, both eyes        Ears:    Normal TM's " and external ear canals, both ears   Nose:   Nares normal, septum midline, mucosa normal, no drainage    or sinus tenderness   Throat:   Lips, mucosa, and tongue normal; teeth and gums normal   Neck:   Supple, symmetrical, trachea midline, no adenopathy;        thyroid:  No enlargement/tenderness/nodules; no carotid    bruit or JVD   Back:     Symmetric, no curvature, ROM normal, no CVA tenderness   Lungs:     Clear to auscultation bilaterally, respirations unlabored   Chest wall:    No tenderness or deformity   Heart:    Regular rate and rhythm, S1 and S2 normal, no murmur, rub   or gallop   Abdomen:     Soft, non-tender, bowel sounds active all four quadrants,     no masses, no organomegaly           Extremities:   Extremities normal, atraumatic, no cyanosis or edema   Pulses:   2+ and symmetric all extremities   Skin:   Skin color, texture, turgor normal, no rashes or lesions   Lymph nodes:   Cervical, supraclavicular, and axillary nodes normal   Neurologic:   CNII-XII intact. Normal strength, sensation and reflexes       throughout       Vitals:    11/10/20 0900   BP: 94/68   Pulse: 70   SpO2: 98%     Body mass index is 25.26 kg/m².      Medications:   Current Outpatient Medications:   •  Calcium Carbonate (CALCIUM 500 PO), Take  by mouth., Disp: , Rfl:   •  Cyanocobalamin (VITAMIN B 12 PO), Take  by mouth., Disp: , Rfl:   •  Multiple Vitamins-Minerals (ANTIOXIDANT PO), Take  by mouth., Disp: , Rfl:   •  Multiple Vitamins-Minerals (MULTIVITAMIN WITH MINERALS) tablet tablet, Take 1 tablet by mouth Daily., Disp: , Rfl:   •  vitamin C (ASCORBIC ACID) 500 MG tablet, Take 500 mg by mouth Daily., Disp: , Rfl:   •  Zinc Sulfate (ZINC 15 PO), Take  by mouth., Disp: , Rfl:   •  cephalexin (Keflex) 500 MG capsule, Take all 4 caps 1 hour prior to procedure, Disp: 4 capsule, Rfl: 2       Assessment/Plan   Healthy male exam.      1. Healthcare Maintenance:  2. Patient Counseling:  --Nutrition: Stressed importance of  moderation in sodium/caffeine intake, saturated fat and cholesterol, caloric balance, sufficient intake of fresh fruits, vegetables, fiber, calcium and vit D  --Exercise: he does exercise reg  --Substance Abuse:  He does exercise reg  --Dental health: he does go to the dentist reg  --Immunizations reviewed.utd with vaccine  rec shon  --Discussed benefits of screening colonoscopy.  3.  Left hip sx-  He has done very wel

## 2020-11-17 ENCOUNTER — TRANSCRIBE ORDERS (OUTPATIENT)
Dept: ADMINISTRATIVE | Facility: HOSPITAL | Age: 60
End: 2020-11-17

## 2020-11-17 DIAGNOSIS — Z13.9 VISIT FOR SCREENING: Primary | ICD-10-CM

## 2020-11-23 ENCOUNTER — HOSPITAL ENCOUNTER (OUTPATIENT)
Dept: CARDIOLOGY | Facility: HOSPITAL | Age: 60
Discharge: HOME | End: 2020-11-23
Admitting: SURGERY

## 2020-11-23 VITALS
BODY MASS INDEX: 26.16 KG/M2 | HEIGHT: 65 IN | HEART RATE: 78 BPM | SYSTOLIC BLOOD PRESSURE: 108 MMHG | DIASTOLIC BLOOD PRESSURE: 64 MMHG | WEIGHT: 157 LBS

## 2020-11-23 DIAGNOSIS — Z13.9 VISIT FOR SCREENING: ICD-10-CM

## 2020-11-23 LAB
BH CV ECHO MEAS - DIST AO DIAM: 1.46 CM
BH CV VAS BP LEFT ARM: NORMAL MMHG
BH CV VAS BP RIGHT ARM: NORMAL MMHG
BH CV XLRA MEAS - MID AO DIAM: 1.84 CM
BH CV XLRA MEAS - PAD LEFT ABI DP: 1.11
BH CV XLRA MEAS - PAD LEFT ABI PT: 1.11
BH CV XLRA MEAS - PAD LEFT ARM: 104 MMHG
BH CV XLRA MEAS - PAD LEFT LEG DP: 120 MMHG
BH CV XLRA MEAS - PAD LEFT LEG PT: 120 MMHG
BH CV XLRA MEAS - PAD RIGHT ABI DP: 1.11
BH CV XLRA MEAS - PAD RIGHT ABI PT: 1.11
BH CV XLRA MEAS - PAD RIGHT ARM: 108 MMHG
BH CV XLRA MEAS - PAD RIGHT LEG DP: 120 MMHG
BH CV XLRA MEAS - PAD RIGHT LEG PT: 120 MMHG
BH CV XLRA MEAS - PROX AO DIAM: 2.12 CM
BH CV XLRA MEAS LEFT ICA/CCA RATIO: 0.97
BH CV XLRA MEAS LEFT MID CCA PSV: NORMAL CM/SEC
BH CV XLRA MEAS LEFT MID ICA PSV: NORMAL CM/SEC
BH CV XLRA MEAS LEFT PROX ECA PSV: NORMAL CM/SEC
BH CV XLRA MEAS RIGHT ICA/CCA RATIO: 0.87
BH CV XLRA MEAS RIGHT MID CCA PSV: NORMAL CM/SEC
BH CV XLRA MEAS RIGHT MID ICA PSV: NORMAL CM/SEC
BH CV XLRA MEAS RIGHT PROX ECA PSV: NORMAL CM/SEC

## 2020-11-23 PROCEDURE — 93799 UNLISTED CV SVC/PROCEDURE: CPT

## 2021-03-26 ENCOUNTER — BULK ORDERING (OUTPATIENT)
Dept: CASE MANAGEMENT | Facility: OTHER | Age: 61
End: 2021-03-26

## 2021-03-26 DIAGNOSIS — Z23 IMMUNIZATION DUE: ICD-10-CM

## 2021-06-07 ENCOUNTER — OFFICE VISIT (OUTPATIENT)
Dept: INTERNAL MEDICINE | Facility: CLINIC | Age: 61
End: 2021-06-07

## 2021-06-07 ENCOUNTER — TELEPHONE (OUTPATIENT)
Dept: INTERNAL MEDICINE | Facility: CLINIC | Age: 61
End: 2021-06-07

## 2021-06-07 VITALS
BODY MASS INDEX: 25.51 KG/M2 | HEART RATE: 90 BPM | OXYGEN SATURATION: 96 % | WEIGHT: 153.1 LBS | HEIGHT: 65 IN | TEMPERATURE: 98.4 F | SYSTOLIC BLOOD PRESSURE: 100 MMHG | DIASTOLIC BLOOD PRESSURE: 58 MMHG

## 2021-06-07 DIAGNOSIS — M54.16 LEFT LUMBAR RADICULOPATHY: Primary | ICD-10-CM

## 2021-06-07 PROCEDURE — 99213 OFFICE O/P EST LOW 20 MIN: CPT | Performed by: INTERNAL MEDICINE

## 2021-06-07 RX ORDER — METAXALONE 800 MG/1
800 TABLET ORAL 3 TIMES DAILY PRN
Qty: 30 TABLET | Refills: 0 | Status: SHIPPED | OUTPATIENT
Start: 2021-06-07 | End: 2021-06-21 | Stop reason: SDUPTHER

## 2021-06-07 RX ORDER — MELOXICAM 15 MG/1
15 TABLET ORAL DAILY
Qty: 30 TABLET | Refills: 1 | OUTPATIENT
Start: 2021-06-07 | End: 2021-10-01

## 2021-06-07 NOTE — TELEPHONE ENCOUNTER
PATIENT CALLING IN REGARDS TO LET DR POWELL KNOW THAT [employer] REQUIRES A PRIOR AUTHORIZATION FROM HIS INSURANCE ANTH. PATIENT DIDN'T HAVE THE INSURANCE PHONE NUMBER TO BEGIN THE PROCESS. PLEASE ADVISE, THANK YOU!

## 2021-06-07 NOTE — PROGRESS NOTES
Subjective   Travis Taylor is a 60 y.o. male.     History of Present Illness   He had acute onset back pain started on Friday  Just started while walking through the house.  Left lower back  occas he will have mild rani going towrd his leg  He did have pain like this in the past and muscle relaxnts helps  Ibuprofen helps some    The following portions of the patient's history were reviewed and updated as appropriate: allergies, current medications, past medical history, past social history and problem list.  No change in PA    Review of Systems    Objective   Physical Exam  Vitals reviewed.   Constitutional:       Appearance: He is well-developed.   HENT:      Head: Normocephalic and atraumatic.      Right Ear: External ear normal.      Left Ear: External ear normal.   Eyes:      Conjunctiva/sclera: Conjunctivae normal.      Pupils: Pupils are equal, round, and reactive to light.   Neck:      Thyroid: No thyromegaly.      Trachea: No tracheal deviation.   Cardiovascular:      Rate and Rhythm: Normal rate and regular rhythm.      Heart sounds: Normal heart sounds.   Pulmonary:      Effort: Pulmonary effort is normal.      Breath sounds: Normal breath sounds.   Abdominal:      General: Bowel sounds are normal. There is no distension.      Palpations: Abdomen is soft.      Tenderness: There is no abdominal tenderness.   Musculoskeletal:         General: No deformity. Normal range of motion.      Cervical back: Normal range of motion.   Skin:     General: Skin is warm and dry.   Neurological:      Mental Status: He is alert and oriented to person, place, and time.   Psychiatric:         Behavior: Behavior normal.         Thought Content: Thought content normal.         Judgment: Judgment normal.         Vitals:    06/07/21 1137   BP: 100/58   Pulse: 90   Temp: 98.4 °F (36.9 °C)   SpO2: 96%     Body mass index is 25.48 kg/m².         Assessment/Plan   Diagnoses and all orders for this visit:    1. Left lumbar  radiculopathy (Primary)    Other orders  -     metaxalone (Skelaxin) 800 MG tablet; Take 1 tablet by mouth 3 (Three) Times a Day As Needed for Muscle Spasms.  Dispense: 30 tablet; Refill: 0  -     Meloxicam 15 MG tablet dispersible; Place 1 tablet on the tongue Daily.  Dispense: 30 tablet; Refill: 1        1.  LBP-  Seems muscular  Try mobic and skelaxin

## 2021-06-21 RX ORDER — METAXALONE 800 MG/1
800 TABLET ORAL 3 TIMES DAILY PRN
Qty: 30 TABLET | Refills: 0 | Status: SHIPPED | OUTPATIENT
Start: 2021-06-21 | End: 2022-04-12

## 2021-06-22 ENCOUNTER — OFFICE VISIT (OUTPATIENT)
Dept: ORTHOPEDIC SURGERY | Facility: CLINIC | Age: 61
End: 2021-06-22

## 2021-06-22 VITALS — TEMPERATURE: 97.2 F | HEIGHT: 66 IN | BODY MASS INDEX: 24.91 KG/M2 | WEIGHT: 155 LBS

## 2021-06-22 DIAGNOSIS — Z96.642 HISTORY OF LEFT HIP REPLACEMENT: Primary | ICD-10-CM

## 2021-06-22 PROCEDURE — 99212 OFFICE O/P EST SF 10 MIN: CPT | Performed by: ORTHOPAEDIC SURGERY

## 2021-06-22 PROCEDURE — 73501 X-RAY EXAM HIP UNI 1 VIEW: CPT | Performed by: ORTHOPAEDIC SURGERY

## 2021-06-22 NOTE — PROGRESS NOTES
"Travis Taylor : 1960 MRN: [id number] DATE: 2021    CHIEF COMPLAINT:  Follow up left total hip      SUBJECTIVE:Patient returns today for one year  follow up of left total hip replacement. Patient reports doing well with no unusual complaints. Denies any limitations due to the hip.    OBJECTIVE:    Temp 97.2 °F (36.2 °C)   Ht 167.6 cm (66\")   Wt 70.3 kg (155 lb)   BMI 25.02 kg/m²   Family History   Problem Relation Age of Onset   • Hypertension Mother    • Arthritis Mother    • Diabetes Mother    • Stroke Father 85   • Dementia Father    • Depression Father    • Lung cancer Paternal Grandfather         & possible mouth cancer; was a smoker.    • Malig Hyperthermia Neg Hx      Past Medical History:   Diagnosis Date   • DDD (degenerative disc disease), lumbar     L4-5   • Fatigue    • H/O Broken jaw (CMS/HCC)    • History of concussion    • Left hip pain    • Leukopenia    • PONV (postoperative nausea and vomiting)      Past Surgical History:   Procedure Laterality Date   • COLONOSCOPY     • HERNIA REPAIR      two serperate surgeries done in  & the other in    • INNER EAR SURGERY Right     Punctured drum   • TOTAL HIP ARTHROPLASTY Left 2020    Procedure: TOTAL HIP ARTHROPLASTY ANTERIOR WITH HANA TABLE;  Surgeon: Jace Montague MD;  Location: [facility];  Service: Orthopedics;  Laterality: Left;     Social History     Socioeconomic History   • Marital status:      Spouse name: JOSE   • Number of children: 2   • Years of education: College   • Highest education level: Not on file   Tobacco Use   • Smoking status: Never Smoker   • Smokeless tobacco: Never Used   Substance and Sexual Activity   • Alcohol use: Yes     Comment: socially   • Drug use: No   • Sexual activity: Defer       Review of Systems: 14 point review of systems performed pertinent positives and negatives discussed above, all other systems are negative    Exam:. The incision is well healed. " Range of motion is good without irritability. The calf is soft and nontender with a negative Homans sign. Alignment is neutral. Leg lengths are equal. Good hip flexion and abduction strength.Walks with nonantalgic gait. Intact to light touch with palpable distal pulses.     DIAGNOSTIC STUDIES  Xrays:Xrays 2 view left hip AP and lateral were ordered and reviewed for follow up of total joint which demonstrate a well positioned DUSTIN without complicating factors.  In comparison to previous films there are no changes.    ASSESSMENT:   Follow up left hip replacement. doing well       PLAN:    Continue activities as tolerated  Follow up TWILA Abbasi, JANETTE  6/22/2021     This patient was seen in conjunction today with Dr. Jace Montague.  Dr. Montague agrees with the above-stated assessment and plan.

## 2021-10-01 ENCOUNTER — APPOINTMENT (OUTPATIENT)
Dept: GENERAL RADIOLOGY | Facility: HOSPITAL | Age: 61
End: 2021-10-01

## 2021-10-01 PROCEDURE — 73140 X-RAY EXAM OF FINGER(S): CPT | Performed by: EMERGENCY MEDICINE

## 2021-11-10 LAB
25(OH)D3+25(OH)D2 SERPL-MCNC: 41.3 NG/ML (ref 30–100)
ALBUMIN SERPL-MCNC: 4.6 G/DL (ref 3.8–4.9)
ALBUMIN/GLOB SERPL: 2.2 {RATIO} (ref 1.2–2.2)
ALP SERPL-CCNC: 70 IU/L (ref 44–121)
ALT SERPL-CCNC: 16 IU/L (ref 0–44)
AST SERPL-CCNC: 14 IU/L (ref 0–40)
BASOPHILS # BLD AUTO: 0 X10E3/UL (ref 0–0.2)
BASOPHILS NFR BLD AUTO: 1 %
BILIRUB SERPL-MCNC: 0.7 MG/DL (ref 0–1.2)
BUN SERPL-MCNC: 14 MG/DL (ref 8–27)
BUN/CREAT SERPL: 15 (ref 10–24)
CALCIUM SERPL-MCNC: 8.8 MG/DL (ref 8.6–10.2)
CHLORIDE SERPL-SCNC: 103 MMOL/L (ref 96–106)
CHOLEST SERPL-MCNC: 183 MG/DL (ref 100–199)
CO2 SERPL-SCNC: 24 MMOL/L (ref 20–29)
CREAT SERPL-MCNC: 0.94 MG/DL (ref 0.76–1.27)
EOSINOPHIL # BLD AUTO: 0.1 X10E3/UL (ref 0–0.4)
EOSINOPHIL NFR BLD AUTO: 2 %
ERYTHROCYTE [DISTWIDTH] IN BLOOD BY AUTOMATED COUNT: 12.6 % (ref 11.6–15.4)
GLOBULIN SER CALC-MCNC: 2.1 G/DL (ref 1.5–4.5)
GLUCOSE SERPL-MCNC: 83 MG/DL (ref 65–99)
HCT VFR BLD AUTO: 47.7 % (ref 37.5–51)
HDLC SERPL-MCNC: 55 MG/DL
HGB BLD-MCNC: 15.6 G/DL (ref 13–17.7)
IMM GRANULOCYTES # BLD AUTO: 0 X10E3/UL (ref 0–0.1)
IMM GRANULOCYTES NFR BLD AUTO: 0 %
LDLC SERPL CALC-MCNC: 115 MG/DL (ref 0–99)
LDLC/HDLC SERPL: 2.1 RATIO (ref 0–3.6)
LYMPHOCYTES # BLD AUTO: 0.6 X10E3/UL (ref 0.7–3.1)
LYMPHOCYTES NFR BLD AUTO: 16 %
MCH RBC QN AUTO: 31.2 PG (ref 26.6–33)
MCHC RBC AUTO-ENTMCNC: 32.7 G/DL (ref 31.5–35.7)
MCV RBC AUTO: 95 FL (ref 79–97)
MONOCYTES # BLD AUTO: 0.4 X10E3/UL (ref 0.1–0.9)
MONOCYTES NFR BLD AUTO: 11 %
NEUTROPHILS # BLD AUTO: 2.5 X10E3/UL (ref 1.4–7)
NEUTROPHILS NFR BLD AUTO: 70 %
PLATELET # BLD AUTO: 271 X10E3/UL (ref 150–450)
POTASSIUM SERPL-SCNC: 4.2 MMOL/L (ref 3.5–5.2)
PROT SERPL-MCNC: 6.7 G/DL (ref 6–8.5)
PSA SERPL-MCNC: 0.9 NG/ML (ref 0–4)
RBC # BLD AUTO: 5 X10E6/UL (ref 4.14–5.8)
SODIUM SERPL-SCNC: 139 MMOL/L (ref 134–144)
TRIGL SERPL-MCNC: 68 MG/DL (ref 0–149)
TSH SERPL DL<=0.005 MIU/L-ACNC: 1.97 UIU/ML (ref 0.45–4.5)
VIT B12 SERPL-MCNC: 713 PG/ML (ref 232–1245)
VLDLC SERPL CALC-MCNC: 13 MG/DL (ref 5–40)
WBC # BLD AUTO: 3.6 X10E3/UL (ref 3.4–10.8)

## 2021-11-16 ENCOUNTER — OFFICE VISIT (OUTPATIENT)
Dept: INTERNAL MEDICINE | Facility: CLINIC | Age: 61
End: 2021-11-16

## 2021-11-16 VITALS
DIASTOLIC BLOOD PRESSURE: 60 MMHG | TEMPERATURE: 98.4 F | HEIGHT: 66 IN | BODY MASS INDEX: 25.34 KG/M2 | HEART RATE: 76 BPM | WEIGHT: 157.7 LBS | SYSTOLIC BLOOD PRESSURE: 100 MMHG | OXYGEN SATURATION: 99 %

## 2021-11-16 DIAGNOSIS — Z00.00 HEALTH CARE MAINTENANCE: Primary | ICD-10-CM

## 2021-11-16 PROCEDURE — 99396 PREV VISIT EST AGE 40-64: CPT | Performed by: INTERNAL MEDICINE

## 2021-11-16 NOTE — PROGRESS NOTES
"Subjective   Travis Taylor is a 61 y.o. male and is here for a comprehensive physical exam. The patient reports problems - none.        Do you take any herbs or supplements that were not prescribed by a doctor? See list      Social History: he does stay active  No tob no etoh  Social History     Socioeconomic History   • Marital status:      Spouse name: JOSE   • Number of children: 2   • Years of education: College   Tobacco Use   • Smoking status: Never Smoker   • Smokeless tobacco: Never Used   Substance and Sexual Activity   • Alcohol use: Yes     Comment: socially   • Drug use: No   • Sexual activity: Defer       Family History:   Family History   Problem Relation Age of Onset   • Hypertension Mother    • Arthritis Mother    • Diabetes Mother    • Stroke Father 85   • Dementia Father    • Depression Father    • Lung cancer Paternal Grandfather         & possible mouth cancer; was a smoker.    • Malig Hyperthermia Neg Hx        Past Medical History:   Past Medical History:   Diagnosis Date   • DDD (degenerative disc disease), lumbar     L4-5   • Fatigue    • H/O Broken jaw (CMS/HCC) 1968   • History of concussion 1968   • Left hip pain    • Leukopenia 2018   • PONV (postoperative nausea and vomiting)            Review of Systems    A comprehensive review of systems was negative.    Objective   /60 (BP Location: Left arm, Patient Position: Sitting)   Pulse 76   Temp 98.4 °F (36.9 °C) (Infrared)   Ht 167.6 cm (66\")   Wt 71.5 kg (157 lb 11.2 oz)   SpO2 99%   BMI 25.45 kg/m²     General Appearance:    Alert, cooperative, no distress, appears stated age   Head:    Normocephalic, without obvious abnormality, atraumatic   Eyes:    PERRL, conjunctiva/corneas clear, EOM's intact, fundi     benign, both eyes        Ears:    Normal TM's and external ear canals, both ears   Nose:   Nares normal, septum midline, mucosa normal, no drainage    or sinus tenderness   Throat:   Lips, mucosa, and tongue " normal; teeth and gums normal   Neck:   Supple, symmetrical, trachea midline, no adenopathy;        thyroid:  No enlargement/tenderness/nodules; no carotid    bruit or JVD   Back:     Symmetric, no curvature, ROM normal, no CVA tenderness   Lungs:     Clear to auscultation bilaterally, respirations unlabored   Chest wall:    No tenderness or deformity   Heart:    Regular rate and rhythm, S1 and S2 normal, no murmur, rub   or gallop   Abdomen:     Soft, non-tender, bowel sounds active all four quadrants,     no masses, no organomegaly   Genitalia:    Normal male without lesion, discharge or tenderness   Rectal:    Normal tone, normal prostate, no masses or tenderness;    guaiac negative stool   Extremities:   Extremities normal, atraumatic, no cyanosis or edema   Pulses:   2+ and symmetric all extremities   Skin:   Skin color, texture, turgor normal, no rashes or lesions   Lymph nodes:   Cervical, supraclavicular, and axillary nodes normal   Neurologic:   CNII-XII intact. Normal strength, sensation and reflexes       throughout       Vitals:    11/16/21 0907   BP: 100/60   Pulse: 76   Temp: 98.4 °F (36.9 °C)   SpO2: 99%     Body mass index is 25.45 kg/m².      Medications:   Current Outpatient Medications:   •  Calcium Carbonate (CALCIUM 500 PO), Take  by mouth., Disp: , Rfl:   •  metaxalone (Skelaxin) 800 MG tablet, Take 1 tablet by mouth 3 (Three) Times a Day As Needed for Muscle Spasms., Disp: 30 tablet, Rfl: 0  •  Multiple Vitamins-Minerals (ANTIOXIDANT PO), Take  by mouth., Disp: , Rfl:   •  Multiple Vitamins-Minerals (MULTIVITAMIN WITH MINERALS) tablet tablet, Take 1 tablet by mouth Daily., Disp: , Rfl:   •  vitamin C (ASCORBIC ACID) 500 MG tablet, Take 500 mg by mouth Daily., Disp: , Rfl:   •  Zinc Sulfate (ZINC 15 PO), Take  by mouth., Disp: , Rfl:        Assessment/Plan   Healthy male exam.      1. Healthcare Maintenance:  2. Patient Counseling:  --Nutrition: Stressed importance of moderation in  sodium/caffeine intake, saturated fat and cholesterol, caloric balance, sufficient intake of fresh fruits, vegetables, fiber, calcium and vit D  --Exercise: . He does stay very active  --Substance Abuse: no tob rare etoh  --Dental health: UTD with dentist  --Immunizations reviewed.utd  --Discussed benefits of screening colonoscopy.

## 2021-11-17 ENCOUNTER — TELEPHONE (OUTPATIENT)
Dept: ORTHOPEDIC SURGERY | Facility: CLINIC | Age: 61
End: 2021-11-17

## 2021-11-17 RX ORDER — CEPHALEXIN 500 MG/1
CAPSULE ORAL
Qty: 4 CAPSULE | Refills: 5 | OUTPATIENT
Start: 2021-11-17 | End: 2022-08-08

## 2021-11-17 NOTE — TELEPHONE ENCOUNTER
Provider: DR SUZY GOMEZ  Caller: SARAI BRUNO  Relationship to Patient: SELF  [facility]  Phone Number: [phone]  Reason for Call: HAVING DENTAL WORK Tuesday 11 23 2021 AND IS REQUESTING ANTIBIOTICS TO BE SENT IN FOR HIM ASAP.  PER PATIENT DR GOMEZ DID KNEE SX 18 MO AGO.  PATIENT ALSO, REQUESTS A CALL BACK, ONCE SENT.

## 2021-11-18 ENCOUNTER — TELEPHONE (OUTPATIENT)
Dept: ORTHOPEDIC SURGERY | Facility: CLINIC | Age: 61
End: 2021-11-18

## 2022-04-06 ENCOUNTER — APPOINTMENT (OUTPATIENT)
Dept: GENERAL RADIOLOGY | Facility: HOSPITAL | Age: 62
End: 2022-04-06

## 2022-04-06 ENCOUNTER — HOSPITAL ENCOUNTER (EMERGENCY)
Facility: HOSPITAL | Age: 62
Discharge: HOME | End: 2022-04-06
Attending: EMERGENCY MEDICINE | Admitting: EMERGENCY MEDICINE

## 2022-04-06 VITALS
SYSTOLIC BLOOD PRESSURE: 110 MMHG | TEMPERATURE: 97.8 F | BODY MASS INDEX: 25.45 KG/M2 | OXYGEN SATURATION: 96 % | DIASTOLIC BLOOD PRESSURE: 81 MMHG | HEART RATE: 64 BPM | RESPIRATION RATE: 16 BRPM | HEIGHT: 66 IN

## 2022-04-06 DIAGNOSIS — R42 LIGHT-HEADEDNESS: ICD-10-CM

## 2022-04-06 DIAGNOSIS — M79.602 LEFT ARM PAIN: Primary | ICD-10-CM

## 2022-04-06 LAB
ALBUMIN SERPL-MCNC: 4.4 G/DL (ref 3.5–5.2)
ALBUMIN/GLOB SERPL: 2.2 G/DL
ALP SERPL-CCNC: 76 U/L (ref 39–117)
ALT SERPL W P-5'-P-CCNC: 24 U/L (ref 1–41)
ANION GAP SERPL CALCULATED.3IONS-SCNC: 8.7 MMOL/L (ref 5–15)
AST SERPL-CCNC: 17 U/L (ref 1–40)
BASOPHILS # BLD AUTO: 0.03 10*3/MM3 (ref 0–0.2)
BASOPHILS NFR BLD AUTO: 0.5 % (ref 0–1.5)
BILIRUB SERPL-MCNC: <0.2 MG/DL (ref 0–1.2)
BUN SERPL-MCNC: 13 MG/DL (ref 8–23)
BUN/CREAT SERPL: 16.7 (ref 7–25)
CALCIUM SPEC-SCNC: 8.6 MG/DL (ref 8.6–10.5)
CHLORIDE SERPL-SCNC: 105 MMOL/L (ref 98–107)
CO2 SERPL-SCNC: 28.3 MMOL/L (ref 22–29)
CREAT SERPL-MCNC: 0.78 MG/DL (ref 0.76–1.27)
DEPRECATED RDW RBC AUTO: 44.6 FL (ref 37–54)
EGFRCR SERPLBLD CKD-EPI 2021: 102.1 ML/MIN/1.73
EOSINOPHIL # BLD AUTO: 0.08 10*3/MM3 (ref 0–0.4)
EOSINOPHIL NFR BLD AUTO: 1.4 % (ref 0.3–6.2)
ERYTHROCYTE [DISTWIDTH] IN BLOOD BY AUTOMATED COUNT: 12.7 % (ref 12.3–15.4)
GLOBULIN UR ELPH-MCNC: 2 GM/DL
GLUCOSE SERPL-MCNC: 100 MG/DL (ref 65–99)
HCT VFR BLD AUTO: 43.8 % (ref 37.5–51)
HGB BLD-MCNC: 14.9 G/DL (ref 13–17.7)
HOLD SPECIMEN: NORMAL
IMM GRANULOCYTES # BLD AUTO: 0.02 10*3/MM3 (ref 0–0.05)
IMM GRANULOCYTES NFR BLD AUTO: 0.3 % (ref 0–0.5)
LYMPHOCYTES # BLD AUTO: 0.74 10*3/MM3 (ref 0.7–3.1)
LYMPHOCYTES NFR BLD AUTO: 12.7 % (ref 19.6–45.3)
MCH RBC QN AUTO: 32.5 PG (ref 26.6–33)
MCHC RBC AUTO-ENTMCNC: 34 G/DL (ref 31.5–35.7)
MCV RBC AUTO: 95.4 FL (ref 79–97)
MONOCYTES # BLD AUTO: 0.62 10*3/MM3 (ref 0.1–0.9)
MONOCYTES NFR BLD AUTO: 10.7 % (ref 5–12)
NEUTROPHILS NFR BLD AUTO: 4.32 10*3/MM3 (ref 1.7–7)
NEUTROPHILS NFR BLD AUTO: 74.4 % (ref 42.7–76)
NRBC BLD AUTO-RTO: 0 /100 WBC (ref 0–0.2)
PLATELET # BLD AUTO: 237 10*3/MM3 (ref 140–450)
PMV BLD AUTO: 8.3 FL (ref 6–12)
POTASSIUM SERPL-SCNC: 3.8 MMOL/L (ref 3.5–5.2)
PROT SERPL-MCNC: 6.4 G/DL (ref 6–8.5)
RBC # BLD AUTO: 4.59 10*6/MM3 (ref 4.14–5.8)
SODIUM SERPL-SCNC: 142 MMOL/L (ref 136–145)
TROPONIN T SERPL-MCNC: <0.01 NG/ML (ref 0–0.03)
TROPONIN T SERPL-MCNC: <0.01 NG/ML (ref 0–0.03)
WBC NRBC COR # BLD: 5.81 10*3/MM3 (ref 3.4–10.8)
WHOLE BLOOD HOLD SPECIMEN: NORMAL
WHOLE BLOOD HOLD SPECIMEN: NORMAL

## 2022-04-06 PROCEDURE — 99284 EMERGENCY DEPT VISIT MOD MDM: CPT

## 2022-04-06 PROCEDURE — 71045 X-RAY EXAM CHEST 1 VIEW: CPT

## 2022-04-06 PROCEDURE — 36415 COLL VENOUS BLD VENIPUNCTURE: CPT

## 2022-04-06 PROCEDURE — 93010 ELECTROCARDIOGRAM REPORT: CPT | Performed by: INTERNAL MEDICINE

## 2022-04-06 PROCEDURE — 93005 ELECTROCARDIOGRAM TRACING: CPT

## 2022-04-06 PROCEDURE — 80053 COMPREHEN METABOLIC PANEL: CPT | Performed by: PHYSICIAN ASSISTANT

## 2022-04-06 PROCEDURE — 84484 ASSAY OF TROPONIN QUANT: CPT | Performed by: PHYSICIAN ASSISTANT

## 2022-04-06 PROCEDURE — 99283 EMERGENCY DEPT VISIT LOW MDM: CPT

## 2022-04-06 PROCEDURE — 93005 ELECTROCARDIOGRAM TRACING: CPT | Performed by: EMERGENCY MEDICINE

## 2022-04-06 PROCEDURE — 93005 ELECTROCARDIOGRAM TRACING: CPT | Performed by: PHYSICIAN ASSISTANT

## 2022-04-06 PROCEDURE — 85025 COMPLETE CBC W/AUTO DIFF WBC: CPT | Performed by: PHYSICIAN ASSISTANT

## 2022-04-06 RX ORDER — ASPIRIN 81 MG/1
324 TABLET, CHEWABLE ORAL ONCE
Status: COMPLETED | OUTPATIENT
Start: 2022-04-06 | End: 2022-04-06

## 2022-04-06 RX ORDER — SODIUM CHLORIDE 0.9 % (FLUSH) 0.9 %
10 SYRINGE (ML) INJECTION AS NEEDED
Status: DISCONTINUED | OUTPATIENT
Start: 2022-04-06 | End: 2022-04-07 | Stop reason: HOSPADM

## 2022-04-06 RX ADMIN — ASPIRIN 324 MG: 81 TABLET, CHEWABLE ORAL at 20:42

## 2022-04-07 LAB
QT INTERVAL: 381 MS
QT INTERVAL: 409 MS

## 2022-04-07 NOTE — ED NOTES
"Pt ambulatory to triage with c/o lightheadedness, fatigue and left arm pain. Pt says \"I thought that I was just hungry.\" pt was sent from urgent care. Pt denies cp currently.     Pt given mask in triage, staff in PPE.    "

## 2022-04-07 NOTE — ED PROVIDER NOTES
"EMERGENCY DEPARTMENT ENCOUNTER    Room Number: 03/03  Date seen: 4/6/2022  Time seen: 20:13 EDT  PCP: Dawna Lima MD    Spoken Language:  English  Language interpretation services not needed     CHIEF COMPLAINT: left arm pain    HPI: Travis Taylor is a 61 y.o. male presenting to the ED for evaluation of left arm pain. The history is being obtained by the patient and by review of the medical chart.  The patient states that at approximately 1730 today he had an episode in which he felt light-headed.  He states that this resolved, but then he began feeling pain in the left upper extremity.  The pain radiates from the left shoulder to the left hand.  He states that the fingers of the left hand all feel \"achy.\"  It does not involve any certain fingers more than others.  The patient denies chest pain or shortness of breath.  He denies any changes of his symptoms with exertion.  He denies nausea, vomiting or diaphoresis.  The patient denies any personal history of hypertension, hyperlipidemia or diabetes.  He is not obese.  He denies any familial history of cardiac disease under the age of 65.  He denies any history of cardiac disease and has never had a cardiac work-up.  He is a non-smoker and has never been a smoker.  The patient does admit to having an episode of feeling fatigued for approximately 1 hour, which resolved on its own.  He states that he got on the Internet to research his symptoms and started to become concerned that it could be related to his heart, which prompted him to come to the hospital for further work-up.  His arm pain is constant.  He denies any specific aggravating or relieving factors.  He denies shoulder pain or neck pain.  He denies any injury or accident which onset his pain.    MEDICAL RECORD REVIEW:  Reviewed in "[employer]     PAST MEDICAL HISTORY  Past Medical History:   Diagnosis Date   • DDD (degenerative disc disease), lumbar     L4-5   • Fatigue    • H/O Broken jaw (CMS/[facility]) 1968   • " History of concussion 1968   • Left hip pain    • Leukopenia 2018   • PONV (postoperative nausea and vomiting)        PAST SURGICAL HISTORY  Past Surgical History:   Procedure Laterality Date   • COLONOSCOPY  2012   • HERNIA REPAIR      two serperate surgeries done in 2009 & the other in 2012   • INNER EAR SURGERY Right 1983    Punctured drum   • TOTAL HIP ARTHROPLASTY Left 6/29/2020    Procedure: TOTAL HIP ARTHROPLASTY ANTERIOR WITH HANA TABLE;  Surgeon: Jace Montague MD;  Location: [facility] MAIN OR;  Service: Orthopedics;  Laterality: Left;       FAMILY HISTORY  Family History   Problem Relation Age of Onset   • Hypertension Mother    • Arthritis Mother    • Diabetes Mother    • Stroke Father 85   • Dementia Father    • Depression Father    • Lung cancer Paternal Grandfather         & possible mouth cancer; was a smoker.    • Malig Hyperthermia Neg Hx        SOCIAL HISTORY  Social History     Socioeconomic History   • Marital status:      Spouse name: JOSE   • Number of children: 2   • Years of education: College   Tobacco Use   • Smoking status: Never Smoker   • Smokeless tobacco: Never Used   Substance and Sexual Activity   • Alcohol use: Yes     Comment: socially   • Drug use: No   • Sexual activity: Defer       CURRENT MEDICATIONS  Prior to Admission medications    Medication Sig Start Date End Date Taking? Authorizing Provider   Calcium Carbonate (CALCIUM 500 PO) Take  by mouth.    ProviderDylon MD   cephalexin (KEFLEX) 500 MG capsule Take all 4 capsules one hour prior to procedure 11/17/21   Thomas Abbasi APRN   metaxalone (Skelaxin) 800 MG tablet Take 1 tablet by mouth 3 (Three) Times a Day As Needed for Muscle Spasms. 6/21/21   Cayla Zheng APRN   Multiple Vitamins-Minerals (ANTIOXIDANT PO) Take  by mouth.    ProviderDylon MD   Multiple Vitamins-Minerals (MULTIVITAMIN WITH MINERALS) tablet tablet Take 1 tablet by mouth Daily.    Dylon Alejo MD   vitamin C (ASCORBIC  ACID) 500 MG tablet Take 500 mg by mouth Daily.    Provider, MD Dylon   Zinc Sulfate (ZINC 15 PO) Take  by mouth.    Provider, MD Dylon       ALLERGIES  Penicillins    REVIEW OF SYSTEMS  All systems reviewed and negative except for those discussed in HPI.     PHYSICAL EXAM  ED Triage Vitals [04/06/22 2004]   Temp Heart Rate Resp BP SpO2   97.8 °F (36.6 °C) 75 16 -- 99 %      Temp src Heart Rate Source Patient Position BP Location FiO2 (%)   -- -- -- -- --       Physical Exam  Constitutional:       Appearance: Normal appearance.   HENT:      Head: Normocephalic and atraumatic.      Mouth/Throat:      Mouth: Mucous membranes are moist.   Eyes:      Extraocular Movements: Extraocular movements intact.      Pupils: Pupils are equal, round, and reactive to light.   Cardiovascular:      Rate and Rhythm: Normal rate and regular rhythm.   Pulmonary:      Effort: Pulmonary effort is normal.      Breath sounds: Normal breath sounds.   Abdominal:      General: There is no distension.      Palpations: Abdomen is soft.      Tenderness: There is no abdominal tenderness.   Musculoskeletal:      Cervical back: Normal range of motion.      Comments: No pain with internal/external rotation to the left shoulder.  No tenderness to palpation to the left shoulder.  Full range of motion of the cervical spine.  No cervical spine tenderness.   Skin:     General: Skin is warm and dry.   Neurological:      General: No focal deficit present.      Mental Status: He is alert and oriented to person, place, and time.   Psychiatric:         Mood and Affect: Mood normal.         Behavior: Behavior normal.         Thought Content: Thought content normal.         Judgment: Judgment normal.         PROCEDURES  Procedures  None    LABS  Recent Results (from the past 24 hour(s))   ECG 12 Lead    Collection Time: 04/06/22  8:12 PM   Result Value Ref Range    QT Interval 381 ms   Comprehensive Metabolic Panel    Collection Time: 04/06/22  8:39  PM    Specimen: Blood   Result Value Ref Range    Glucose 100 (H) 65 - 99 mg/dL    BUN 13 8 - 23 mg/dL    Creatinine 0.78 0.76 - 1.27 mg/dL    Sodium 142 136 - 145 mmol/L    Potassium 3.8 3.5 - 5.2 mmol/L    Chloride 105 98 - 107 mmol/L    CO2 28.3 22.0 - 29.0 mmol/L    Calcium 8.6 8.6 - 10.5 mg/dL    Total Protein 6.4 6.0 - 8.5 g/dL    Albumin 4.40 3.50 - 5.20 g/dL    ALT (SGPT) 24 1 - 41 U/L    AST (SGOT) 17 1 - 40 U/L    Alkaline Phosphatase 76 39 - 117 U/L    Total Bilirubin <0.2 0.0 - 1.2 mg/dL    Globulin 2.0 gm/dL    A/G Ratio 2.2 g/dL    BUN/Creatinine Ratio 16.7 7.0 - 25.0    Anion Gap 8.7 5.0 - 15.0 mmol/L    eGFR 102.1 >60.0 mL/min/1.73   Troponin    Collection Time: 04/06/22  8:39 PM    Specimen: Blood   Result Value Ref Range    Troponin T <0.010 0.000 - 0.030 ng/mL   Green Top (Gel)    Collection Time: 04/06/22  8:39 PM   Result Value Ref Range    Extra Tube Hold for add-ons.    Lavender Top    Collection Time: 04/06/22  8:39 PM   Result Value Ref Range    Extra Tube hold for add-on    Light Blue Top    Collection Time: 04/06/22  8:39 PM   Result Value Ref Range    Extra Tube hold for add-on    CBC Auto Differential    Collection Time: 04/06/22  8:39 PM    Specimen: Blood   Result Value Ref Range    WBC 5.81 3.40 - 10.80 10*3/mm3    RBC 4.59 4.14 - 5.80 10*6/mm3    Hemoglobin 14.9 13.0 - 17.7 g/dL    Hematocrit 43.8 37.5 - 51.0 %    MCV 95.4 79.0 - 97.0 fL    MCH 32.5 26.6 - 33.0 pg    MCHC 34.0 31.5 - 35.7 g/dL    RDW 12.7 12.3 - 15.4 %    RDW-SD 44.6 37.0 - 54.0 fl    MPV 8.3 6.0 - 12.0 fL    Platelets 237 140 - 450 10*3/mm3    Neutrophil % 74.4 42.7 - 76.0 %    Lymphocyte % 12.7 (L) 19.6 - 45.3 %    Monocyte % 10.7 5.0 - 12.0 %    Eosinophil % 1.4 0.3 - 6.2 %    Basophil % 0.5 0.0 - 1.5 %    Immature Grans % 0.3 0.0 - 0.5 %    Neutrophils, Absolute 4.32 1.70 - 7.00 10*3/mm3    Lymphocytes, Absolute 0.74 0.70 - 3.10 10*3/mm3    Monocytes, Absolute 0.62 0.10 - 0.90 10*3/mm3    Eosinophils, Absolute  0.08 0.00 - 0.40 10*3/mm3    Basophils, Absolute 0.03 0.00 - 0.20 10*3/mm3    Immature Grans, Absolute 0.02 0.00 - 0.05 10*3/mm3    nRBC 0.0 0.0 - 0.2 /100 WBC   Troponin    Collection Time: 04/06/22 10:25 PM    Specimen: Blood   Result Value Ref Range    Troponin T <0.010 0.000 - 0.030 ng/mL       RADIOLOGY  XR Chest 1 View   Final Result   No evidence for acute pulmonary process. Follow-up as   clinical indications persist.       This report was finalized on 4/6/2022 8:57 PM by Dr. Hakan Valenzuela M.D.              MEDICATIONS GIVEN IN THE ER  Medications   sodium chloride 0.9 % flush 10 mL (has no administration in time range)   aspirin chewable tablet 324 mg (324 mg Oral Given 4/6/22 2042)       MEDICAL DECISION MAKING, CONSULTS AND PROGRESS NOTES  All labs and all radiology studies were have been viewed and interpreted by me.   Discussion below represents my analysis of pertinent findings related to patient's condition, differential diagnosis, treatment plan and final disposition.    Differential diagnosis includes but is not limited to:  -acute coronary syndrome  -Cervical radiculopathy  -Shoulder injury    HEART SCORE:    History #0  (Highly suspicious 2, Moderately suspicious 1, Slightly or non-suspicious 0)    ECG #0  (Significant ST depression 2,  Nonspecific repol disturbance 1, Normal 0)    Age #1  (> or = 65 2, 46-65 1,  < or = 45 0)    Risk factors #0  (hypercholesterolemia, HTN, DM, smoking, pos fam hx, obesity)  (> or = to 3 RF 2, 1 or 2 1, No risk factors 0)    Troponin #0  (> or = 3x normal limit 2, 1-3x normal limit 1, < or = Normal limit 0)    HEART Score Key:  Scores 0-3: 0.9-1.7% risk of adverse cardiac event. In the HEART Score study, these patients were discharged (0.99% in the retrospective study, 1.7% in the prospective study)  Scores 4-6: 12-16.6% risk of adverse cardiac event. In the HEART Score study, these patients were admitted to the hospital. (11.6% retrospective, 16.6%  prospective)  Scores ?7: 50-65% risk of adverse cardiac event. In the HEART Score study, these patients were candidates for early invasive measures. (65.2% retrospective, 50.1% prospective)      This patient's HEART score is 1        PPE: The patient was placed in a face mask in first look. Patient was wearing facemask when I entered the room and throughout our encounter. I wore full protective equipment throughout this patient encounter including a face mask, eye protection and gloves. Hand hygiene was performed before donning protective equipment and after removal when leaving the room.    AS OF 23:17 EDT VITALS:    BP - 118/73  HR - 68  TEMP - 97.8 °F (36.6 °C)  O2 SATS - 95%         The patient's history, physical exam, and lab findings were discussed with the physician, who also performed a face to face history and physical exam.  I discussed all results and noted any abnormalities with patient.  Discussed absoute need to recheck abnormalities with their family physician.  I answered any of the patient's questions.  Discussed plan for discharge, as there is no emergent indication for admission.  Pt is agreeable and understands need for follow up and repeat testing.  Pt is aware that discharge does not mean that nothing is wrong but it indicates no emergency is present and they must continue care with their family physician.  Pt is discharged with instructions to follow up with primary care doctor to have their blood pressure rechecked.     DIAGNOSIS   Diagnosis Plan   1. Left arm pain     2. Light-headedness         DISPOSITION  ED Disposition     ED Disposition   Discharge    Condition   Stable    Comment   --             FOLLOW UP  Dawna Lima MD  [address]  [address] [place] [id number] [phone]    Schedule an appointment as soon as possible for a visit         RX  Medications   sodium chloride 0.9 % flush 10 mL (has no administration in time range)   aspirin chewable tablet 324 mg (324 mg  Oral Given 4/6/22 2042)          Medication List      No changes were made to your prescriptions during this visit.       Provider Attestation:  I personally reviewed the past medical history, past surgical history, social history, family history, current medications and allergies as they appear in the chart. I reviewed the patient's history, physical, lab/imaging results and overall care with Dr. Tam who is in agreement with the patient's treatment plan.    EMR Dragon/Transcription disclaimer:  Some of this encounter note is an electronic transcription/translation of spoken language to printed text. The electronic translation of spoken language may permit erroneous, or at times, nonsensical words or phrases to be inadvertently transcribed; Although I have reviewed the note for such errors, some may still exist.    Provider note signed by:         Edyta Razo PA  04/06/22 1680

## 2022-04-07 NOTE — ED PROVIDER NOTES
MD ATTESTATION NOTE    The ANAHI and I have discussed this patient's history, physical exam, and treatment plan.  I have reviewed the documentation and personally had a face to face interaction with the patient. I affirm the documentation and agree with the treatment and plan.  The attached note describes my personal findings.    I provided a substantive portion of the care of this patient. I personally performed the physical exam, in its entirety.    Travis Taylor is a 61 y.o. male who presents to the ED c/o left arm pain.  He reports that today he developed some pain in his left arm.  He states that it developed spontaneously.  He denies any specific injury although he does report he has been working in the yard recently.  He reports that he felt somewhat lightheaded and felt some fatigue.  He reports he googled his symptoms and became concerned.  He went to the urgent care center where they directed him here for further evaluation.  He has no chest pain or shortness of breath.  He has no nausea.  He has no diaphoresis.  He has not taken any medicine for his symptoms.  He denies any history of cardiac disease.  He has never had cardiac work-up.  Nothing makes his pain worse or better.      On exam:  GENERAL: Awake, alert, no acute distress  SKIN: Warm, dry  HENT: Normocephalic, atraumatic  EYES: no scleral icterus  CV: regular rhythm, regular rate  RESPIRATORY: normal effort, lungs clear  ABDOMEN: soft, nontender, nondistended  MUSCULOSKELETAL: no deformity  NEURO: alert, moves all extremities, follows commands    Labs  Recent Results (from the past 24 hour(s))   ECG 12 Lead    Collection Time: 04/06/22  8:12 PM   Result Value Ref Range    QT Interval 381 ms   Comprehensive Metabolic Panel    Collection Time: 04/06/22  8:39 PM    Specimen: Blood   Result Value Ref Range    Glucose 100 (H) 65 - 99 mg/dL    BUN 13 8 - 23 mg/dL    Creatinine 0.78 0.76 - 1.27 mg/dL    Sodium 142 136 - 145 mmol/L    Potassium 3.8 3.5 -  5.2 mmol/L    Chloride 105 98 - 107 mmol/L    CO2 28.3 22.0 - 29.0 mmol/L    Calcium 8.6 8.6 - 10.5 mg/dL    Total Protein 6.4 6.0 - 8.5 g/dL    Albumin 4.40 3.50 - 5.20 g/dL    ALT (SGPT) 24 1 - 41 U/L    AST (SGOT) 17 1 - 40 U/L    Alkaline Phosphatase 76 39 - 117 U/L    Total Bilirubin <0.2 0.0 - 1.2 mg/dL    Globulin 2.0 gm/dL    A/G Ratio 2.2 g/dL    BUN/Creatinine Ratio 16.7 7.0 - 25.0    Anion Gap 8.7 5.0 - 15.0 mmol/L    eGFR 102.1 >60.0 mL/min/1.73   Troponin    Collection Time: 04/06/22  8:39 PM    Specimen: Blood   Result Value Ref Range    Troponin T <0.010 0.000 - 0.030 ng/mL   Green Top (Gel)    Collection Time: 04/06/22  8:39 PM   Result Value Ref Range    Extra Tube Hold for add-ons.    Lavender Top    Collection Time: 04/06/22  8:39 PM   Result Value Ref Range    Extra Tube hold for add-on    Light Blue Top    Collection Time: 04/06/22  8:39 PM   Result Value Ref Range    Extra Tube hold for add-on    CBC Auto Differential    Collection Time: 04/06/22  8:39 PM    Specimen: Blood   Result Value Ref Range    WBC 5.81 3.40 - 10.80 10*3/mm3    RBC 4.59 4.14 - 5.80 10*6/mm3    Hemoglobin 14.9 13.0 - 17.7 g/dL    Hematocrit 43.8 37.5 - 51.0 %    MCV 95.4 79.0 - 97.0 fL    MCH 32.5 26.6 - 33.0 pg    MCHC 34.0 31.5 - 35.7 g/dL    RDW 12.7 12.3 - 15.4 %    RDW-SD 44.6 37.0 - 54.0 fl    MPV 8.3 6.0 - 12.0 fL    Platelets 237 140 - 450 10*3/mm3    Neutrophil % 74.4 42.7 - 76.0 %    Lymphocyte % 12.7 (L) 19.6 - 45.3 %    Monocyte % 10.7 5.0 - 12.0 %    Eosinophil % 1.4 0.3 - 6.2 %    Basophil % 0.5 0.0 - 1.5 %    Immature Grans % 0.3 0.0 - 0.5 %    Neutrophils, Absolute 4.32 1.70 - 7.00 10*3/mm3    Lymphocytes, Absolute 0.74 0.70 - 3.10 10*3/mm3    Monocytes, Absolute 0.62 0.10 - 0.90 10*3/mm3    Eosinophils, Absolute 0.08 0.00 - 0.40 10*3/mm3    Basophils, Absolute 0.03 0.00 - 0.20 10*3/mm3    Immature Grans, Absolute 0.02 0.00 - 0.05 10*3/mm3    nRBC 0.0 0.0 - 0.2 /100 WBC   Troponin    Collection Time:  04/06/22 10:25 PM    Specimen: Blood   Result Value Ref Range    Troponin T <0.010 0.000 - 0.030 ng/mL       Radiology  XR Chest 1 View    Result Date: 4/6/2022  XR CHEST 1 VW-  HISTORY: Male who is 61 years-old,  chest pain  TECHNIQUE: Frontal view of the chest  COMPARISON: None available  FINDINGS: Heart, mediastinum and pulmonary vasculature are unremarkable. No focal pulmonary consolidation, pleural effusion, or pneumothorax is seen, lateral costophrenic angles are slightly excluded from the image. No acute osseous process.      No evidence for acute pulmonary process. Follow-up as clinical indications persist.  This report was finalized on 4/6/2022 8:57 PM by Dr. Hakan Valenzuela M.D.        Medical Decision Making:       His history is not concerning for ischemic cardiac disease.  He has no chest pain or shortness of breath.  He has no vascular compromise of his left upper extremity.  His pain is nondermatomal.  Plan to check EKG, troponin, chest x-ray, chemistries and blood counts.  If these are reassuring, I do not feel that further testing is necessary.    PPE: The patient wore a mask and I wore an N95 mask throughout the entire patient encounter.      The patient has a COVID HM Topic on their chart, and they are fully vaccinated.    Diagnosis  Final diagnoses:   Left arm pain   Light-headedness        Kev Tam MD  04/07/22 0109

## 2022-04-12 ENCOUNTER — OFFICE VISIT (OUTPATIENT)
Dept: INTERNAL MEDICINE | Facility: CLINIC | Age: 62
End: 2022-04-12

## 2022-04-12 VITALS
HEART RATE: 80 BPM | SYSTOLIC BLOOD PRESSURE: 94 MMHG | TEMPERATURE: 98.2 F | DIASTOLIC BLOOD PRESSURE: 62 MMHG | WEIGHT: 161.1 LBS | OXYGEN SATURATION: 98 % | BODY MASS INDEX: 25.89 KG/M2 | HEIGHT: 66 IN

## 2022-04-12 DIAGNOSIS — M79.602 LEFT ARM PAIN: Primary | ICD-10-CM

## 2022-04-12 PROCEDURE — 99214 OFFICE O/P EST MOD 30 MIN: CPT | Performed by: INTERNAL MEDICINE

## 2022-04-12 NOTE — PROGRESS NOTES
Subjective   Travis Taylor is a 61 y.o. male here today for a ER f/u for light headedness and left arm pain.      History of Present Illness   Pt was seen in the ER 1 week ago with left arm ache and pain  constant  BRADSHAW was neg.  No CP no SOB  Pain was gone by the time he left the ER and has not recurred    The following portions of the patient's history were reviewed and updated as appropriate: allergies, current medications, past medical history, past social history and problem list.  No recent trauma  No change in PA    Review of Systems    Objective   Physical Exam  Vitals reviewed.   Constitutional:       Appearance: He is well-developed.   HENT:      Head: Normocephalic and atraumatic.      Right Ear: External ear normal.      Left Ear: External ear normal.   Eyes:      Conjunctiva/sclera: Conjunctivae normal.      Pupils: Pupils are equal, round, and reactive to light.   Neck:      Thyroid: No thyromegaly.      Trachea: No tracheal deviation.   Cardiovascular:      Rate and Rhythm: Normal rate and regular rhythm.      Heart sounds: Normal heart sounds.   Pulmonary:      Effort: Pulmonary effort is normal.      Breath sounds: Normal breath sounds.   Abdominal:      General: Bowel sounds are normal. There is no distension.      Palpations: Abdomen is soft.      Tenderness: There is no abdominal tenderness.   Musculoskeletal:         General: No deformity. Normal range of motion.      Cervical back: Normal range of motion.   Skin:     General: Skin is warm and dry.   Neurological:      Mental Status: He is alert and oriented to person, place, and time.   Psychiatric:         Behavior: Behavior normal.         Thought Content: Thought content normal.         Judgment: Judgment normal.         Vitals:    04/12/22 1127   BP: 94/62   Pulse: 80   Temp: 98.2 °F (36.8 °C)   SpO2: 98%     Body mass index is 26 kg/m².       Current Outpatient Medications:   •  Calcium Carbonate (CALCIUM 500 PO), Take  by mouth., Disp: ,  Rfl:   •  Multiple Vitamins-Minerals (ANTIOXIDANT PO), Take  by mouth., Disp: , Rfl:   •  Multiple Vitamins-Minerals (MULTIVITAMIN WITH MINERALS) tablet tablet, Take 1 tablet by mouth Daily., Disp: , Rfl:   •  vitamin C (ASCORBIC ACID) 500 MG tablet, Take 500 mg by mouth Daily., Disp: , Rfl:   •  Zinc Sulfate (ZINC 15 PO), Take  by mouth., Disp: , Rfl:   •  cephalexin (KEFLEX) 500 MG capsule, Take all 4 capsules one hour prior to procedure, Disp: 4 capsule, Rfl: 5      Assessment/Plan   Diagnoses and all orders for this visit:    1. Left arm pain (Primary)    1.  Left arm pain-cardiac work-up in the ER was negative.  Pain had resolved before he left the ER and he has not had any recurrence since then.  I did advise him on some things with posture is a suppose he could have pinched a nerve.  But this is all resolved.  Any further issues he is to let me know

## 2022-11-09 DIAGNOSIS — Z00.00 HEALTH CARE MAINTENANCE: ICD-10-CM

## 2022-11-10 DIAGNOSIS — Z00.00 HEALTH CARE MAINTENANCE: Primary | ICD-10-CM

## 2022-11-10 DIAGNOSIS — R73.09 ELEVATED GLUCOSE: ICD-10-CM

## 2022-11-10 DIAGNOSIS — R35.1 NOCTURIA: ICD-10-CM

## 2022-11-11 LAB
25(OH)D3+25(OH)D2 SERPL-MCNC: 49.9 NG/ML (ref 30–100)
ALBUMIN SERPL-MCNC: 4.5 G/DL (ref 3.5–5.2)
ALBUMIN/GLOB SERPL: 2.4 G/DL
ALP SERPL-CCNC: 63 U/L (ref 39–117)
ALT SERPL-CCNC: 18 U/L (ref 1–41)
AST SERPL-CCNC: 17 U/L (ref 1–40)
BASOPHILS # BLD AUTO: 0.02 10*3/MM3 (ref 0–0.2)
BASOPHILS NFR BLD AUTO: 0.6 % (ref 0–1.5)
BILIRUB SERPL-MCNC: 0.5 MG/DL (ref 0–1.2)
BUN SERPL-MCNC: 13 MG/DL (ref 8–23)
BUN/CREAT SERPL: 14 (ref 7–25)
CALCIUM SERPL-MCNC: 8.8 MG/DL (ref 8.6–10.5)
CHLORIDE SERPL-SCNC: 104 MMOL/L (ref 98–107)
CHOLEST SERPL-MCNC: 195 MG/DL (ref 0–200)
CO2 SERPL-SCNC: 28 MMOL/L (ref 22–29)
CREAT SERPL-MCNC: 0.93 MG/DL (ref 0.76–1.27)
EGFRCR SERPLBLD CKD-EPI 2021: 93.4 ML/MIN/1.73
EOSINOPHIL # BLD AUTO: 0.07 10*3/MM3 (ref 0–0.4)
EOSINOPHIL NFR BLD AUTO: 2.1 % (ref 0.3–6.2)
ERYTHROCYTE [DISTWIDTH] IN BLOOD BY AUTOMATED COUNT: 12.2 % (ref 12.3–15.4)
GLOBULIN SER CALC-MCNC: 1.9 GM/DL
GLUCOSE SERPL-MCNC: 82 MG/DL (ref 65–99)
HBA1C MFR BLD: 5.5 % (ref 4.8–5.6)
HCT VFR BLD AUTO: 46.7 % (ref 37.5–51)
HDLC SERPL-MCNC: 52 MG/DL (ref 40–60)
HGB BLD-MCNC: 16 G/DL (ref 13–17.7)
IMM GRANULOCYTES # BLD AUTO: 0.01 10*3/MM3 (ref 0–0.05)
IMM GRANULOCYTES NFR BLD AUTO: 0.3 % (ref 0–0.5)
LDLC SERPL CALC-MCNC: 131 MG/DL (ref 0–100)
LDLC/HDLC SERPL: 2.5 {RATIO}
LYMPHOCYTES # BLD AUTO: 0.53 10*3/MM3 (ref 0.7–3.1)
LYMPHOCYTES NFR BLD AUTO: 16.2 % (ref 19.6–45.3)
MCH RBC QN AUTO: 31.6 PG (ref 26.6–33)
MCHC RBC AUTO-ENTMCNC: 34.3 G/DL (ref 31.5–35.7)
MCV RBC AUTO: 92.1 FL (ref 79–97)
MONOCYTES # BLD AUTO: 0.38 10*3/MM3 (ref 0.1–0.9)
MONOCYTES NFR BLD AUTO: 11.6 % (ref 5–12)
NEUTROPHILS # BLD AUTO: 2.26 10*3/MM3 (ref 1.7–7)
NEUTROPHILS NFR BLD AUTO: 69.2 % (ref 42.7–76)
NRBC BLD AUTO-RTO: 0.3 /100 WBC (ref 0–0.2)
PLATELET # BLD AUTO: 254 10*3/MM3 (ref 140–450)
POTASSIUM SERPL-SCNC: 4.6 MMOL/L (ref 3.5–5.2)
PROT SERPL-MCNC: 6.4 G/DL (ref 6–8.5)
PSA SERPL-MCNC: 0.9 NG/ML (ref 0–4)
RBC # BLD AUTO: 5.07 10*6/MM3 (ref 4.14–5.8)
SODIUM SERPL-SCNC: 139 MMOL/L (ref 136–145)
TRIGL SERPL-MCNC: 66 MG/DL (ref 0–150)
TSH SERPL DL<=0.005 MIU/L-ACNC: 1.75 UIU/ML (ref 0.27–4.2)
VLDLC SERPL CALC-MCNC: 12 MG/DL (ref 5–40)
WBC # BLD AUTO: 3.27 10*3/MM3 (ref 3.4–10.8)

## 2022-11-17 ENCOUNTER — OFFICE VISIT (OUTPATIENT)
Dept: INTERNAL MEDICINE | Facility: CLINIC | Age: 62
End: 2022-11-17

## 2022-11-17 VITALS
DIASTOLIC BLOOD PRESSURE: 62 MMHG | HEART RATE: 86 BPM | BODY MASS INDEX: 25.33 KG/M2 | OXYGEN SATURATION: 99 % | TEMPERATURE: 97.8 F | SYSTOLIC BLOOD PRESSURE: 96 MMHG | WEIGHT: 157.6 LBS | HEIGHT: 66 IN

## 2022-11-17 DIAGNOSIS — Z00.00 HEALTH CARE MAINTENANCE: Primary | ICD-10-CM

## 2022-11-17 DIAGNOSIS — E78.5 HYPERLIPIDEMIA, UNSPECIFIED HYPERLIPIDEMIA TYPE: ICD-10-CM

## 2022-11-17 PROCEDURE — 99396 PREV VISIT EST AGE 40-64: CPT | Performed by: INTERNAL MEDICINE

## 2022-11-17 RX ORDER — METAXALONE 800 MG/1
800 TABLET ORAL 3 TIMES DAILY PRN
Qty: 30 TABLET | Refills: 0 | Status: SHIPPED | OUTPATIENT
Start: 2022-11-17

## 2022-11-17 RX ORDER — MELOXICAM 15 MG/1
15 TABLET ORAL DAILY PRN
Qty: 30 TABLET | Refills: 0 | Status: SHIPPED | OUTPATIENT
Start: 2022-11-17

## 2022-11-17 NOTE — PATIENT INSTRUCTIONS
"Fat and Cholesterol Restricted Eating Plan  Getting too much fat and cholesterol in your diet may cause health problems. Choosing the right foods helps keep your fat and cholesterol at normal levels. This can keep you from getting certain diseases.  Your doctor may recommend an eating plan that includes:  Total fat: ______% or less of total calories a day. This is ______g of fat a day.  Saturated fat: ______% or less of total calories a day. This is ______g of saturated fat a day.  Cholesterol: less than _________mg a day.  Fiber: ______g a day.  What are tips for following this plan?  General tips  Work with your doctor to lose weight if you need to.  Avoid:  Foods with added sugar.  Fried foods.  Foods with trans fat or partially hydrogenated oils. This includes some margarines and baked goods.  If you drink alcohol:  Limit how much you have to:  0-1 drink a day for women who are not pregnant.  0-2 drinks a day for men.  Know how much alcohol is in a drink. In the [place] one drink equals one 12 oz bottle of beer (355 mL), one 5 oz glass of wine (148 mL), or one 1½ oz glass of hard liquor (44 mL).  Reading food labels  Check food labels for:  Trans fats.  Partially hydrogenated oils.  Saturated fat (g) in each serving.  Cholesterol (mg) in each serving.  Fiber (g) in each serving.  Choose foods with healthy fats, such as:  Monounsaturated fats and polyunsaturated fats. These include olive and canola oil, flaxseeds, walnuts, almonds, and seeds.  Omega-3 fats. These are found in certain fish, flaxseed oil, and ground flaxseeds.  Choose grain products that have whole grains. Look for the word \"whole\" as the first word in the ingredient list.  Cooking  Cook foods using low-fat methods. These include baking, boiling, grilling, and broiling.  Eat more home-cooked foods. Eat at restaurants and buffets less often. Eat less fast food.  Avoid cooking using saturated fats, such as butter, cream, palm oil, palm kernel oil, and " coconut oil.  Meal planning    At meals, divide your plate into four equal parts:  Fill one-half of your plate with vegetables, green salads, and fruit.  Fill one-fourth of your plate with whole grains.  Fill one-fourth of your plate with low-fat (lean) protein foods.  Eat fish that is high in omega-3 fats at least two times a week. This includes mackerel, tuna, sardines, and salmon.  Eat foods that are high in fiber, such as whole grains, beans, apples, pears, berries, broccoli, carrots, peas, and barley.  What foods should I eat?  Fruits  All fresh, canned (in natural juice), or frozen fruits.  Vegetables  Fresh or frozen vegetables (raw, steamed, roasted, or grilled). Green salads.  Grains  Whole grains, such as whole wheat or whole grain breads, crackers, cereals, and pasta. Unsweetened oatmeal, bulgur, barley, quinoa, or brown rice. Corn or whole wheat flour tortillas.  Meats and other protein foods  Ground beef (85% or leaner), grass-fed beef, or beef trimmed of fat. Skinless chicken or turkey. Ground chicken or turkey. Pork trimmed of fat. All fish and seafood. Egg whites. Dried beans, peas, or lentils. Unsalted nuts or seeds. Unsalted canned beans. Nut butters without added sugar or oil.  Dairy  Low-fat or nonfat dairy products, such as skim or 1% milk, 2% or reduced-fat cheeses, low-fat and fat-free ricotta or cottage cheese, or plain low-fat and nonfat yogurt.  Fats and oils  Tub margarine without trans fats. Light or reduced-fat mayonnaise and salad dressings. Avocado. Olive, canola, sesame, or safflower oils.  The items listed above may not be a complete list of foods and beverages you can eat. Contact a dietitian for more information.  What foods should I avoid?  Fruits  Canned fruit in heavy syrup. Fruit in cream or butter sauce. Fried fruit.  Vegetables  Vegetables cooked in cheese, cream, or butter sauce. Fried vegetables.  Grains  White bread. White pasta. White rice. Cornbread. Bagels, pastries,  and croissants. Crackers and snack foods that contain trans fat and hydrogenated oils.  Meats and other protein foods  Fatty cuts of meat. Ribs, chicken wings, levine, sausage, bologna, salami, chitterlings, fatback, hot dogs, bratwurst, and packaged lunch meats. Liver and organ meats. Whole eggs and egg yolks. Chicken and turkey with skin. Fried meat.  Dairy  Whole or 2% milk, cream, half-and-half, and cream cheese. Whole milk cheeses. Whole-fat or sweetened yogurt. Full-fat cheeses. Nondairy creamers and whipped toppings. Processed cheese, cheese spreads, and cheese curds.  Fats and oils  Butter, stick margarine, lard, shortening, ghee, or levine fat. Coconut, palm kernel, and palm oils.  Beverages  Alcohol. Sugar-sweetened drinks such as sodas, lemonade, and fruit drinks.  Sweets and desserts  Corn syrup, sugars, honey, and molasses. Candy. Jam and jelly. Syrup. Sweetened cereals. Cookies, pies, cakes, donuts, muffins, and ice cream.  The items listed above may not be a complete list of foods and beverages you should avoid. Contact a dietitian for more information.  Summary  Choosing the right foods helps keep your fat and cholesterol at normal levels. This can keep you from getting certain diseases.  At meals, fill one-half of your plate with vegetables, green salads, and fruits.  Eat high fiber foods, like whole grains, beans, apples, pears, berries, carrots, peas, and barley.  Limit added sugar, saturated fats, alcohol, and fried foods.  This information is not intended to replace advice given to you by your health care provider. Make sure you discuss any questions you have with your health care provider.  Document Revised: 04/29/2022 Document Reviewed: 04/29/2022  Elsevier Patient Education © 2022 [employer]

## 2022-11-17 NOTE — PROGRESS NOTES
"Subjective   Travis Taylor is a 62 y.o. male and is here for a comprehensive physical exam. The patient reports problems - recent URI.  Pharyngitis  Treated with an abx      Do you take any herbs or supplements that were not prescribed by a doctor?       Social History: no tob no etoh  Social History     Socioeconomic History   • Marital status:      Spouse name: JOSE   • Number of children: 2   • Years of education: College   Tobacco Use   • Smoking status: Never   • Smokeless tobacco: Never   Substance and Sexual Activity   • Alcohol use: Yes     Comment: socially   • Drug use: No   • Sexual activity: Defer       Family History:   Family History   Problem Relation Age of Onset   • Hypertension Mother    • Arthritis Mother    • Diabetes Mother    • Stroke Father 85   • Dementia Father    • Depression Father    • Lung cancer Paternal Grandfather         & possible mouth cancer; was a smoker.    • Malig Hyperthermia Neg Hx        Past Medical History:   Past Medical History:   Diagnosis Date   • DDD (degenerative disc disease), lumbar     L4-5   • Fatigue    • H/O Broken jaw (CMS/HCC) 1968   • History of concussion 1968   • Left hip pain    • Leukopenia 2018   • PONV (postoperative nausea and vomiting)            Review of Systems    A comprehensive review of systems was negative.    Objective   BP 96/62 (BP Location: Left arm, Patient Position: Sitting)   Pulse 86   Temp 97.8 °F (36.6 °C) (Infrared)   Ht 167.6 cm (66\")   Wt 71.5 kg (157 lb 9.6 oz)   SpO2 99%   BMI 25.44 kg/m²     General Appearance:    Alert, cooperative, no distress, appears stated age   Head:    Normocephalic, without obvious abnormality, atraumatic   Eyes:    PERRL, conjunctiva/corneas clear, EOM's intact, fundi     benign, both eyes        Ears:    Normal TM's and external ear canals, both ears   Nose:   Nares normal, septum midline, mucosa normal, no drainage    or sinus tenderness   Throat:   Lips, mucosa, and tongue normal; " teeth and gums normal   Neck:   Supple, symmetrical, trachea midline, no adenopathy;        thyroid:  No enlargement/tenderness/nodules; no carotid    bruit or JVD   Back:     Symmetric, no curvature, ROM normal, no CVA tenderness   Lungs:     Clear to auscultation bilaterally, respirations unlabored   Chest wall:    No tenderness or deformity   Heart:    Regular rate and rhythm, S1 and S2 normal, no murmur, rub   or gallop   Abdomen:     Soft, non-tender, bowel sounds active all four quadrants,     no masses, no organomegaly           Extremities:   Extremities normal, atraumatic, no cyanosis or edema   Pulses:   2+ and symmetric all extremities   Skin:   Skin color, texture, turgor normal, no rashes or lesions   Lymph nodes:   Cervical, supraclavicular, and axillary nodes normal   Neurologic:   CNII-XII intact. Normal strength, sensation and reflexes       throughout       Vitals:    11/17/22 0835   BP: 96/62   Pulse: 86   Temp: 97.8 °F (36.6 °C)   SpO2: 99%     Body mass index is 25.44 kg/m².      Medications:   Current Outpatient Medications:   •  azithromycin (ZITHROMAX) 250 MG tablet, Take 1 tablet by mouth Daily for 5 days. Take 2 tablets today, then 1 tablet daily, Disp: 6 tablet, Rfl: 0  •  Calcium Carbonate (CALCIUM 500 PO), Take  by mouth., Disp: , Rfl:   •  Multiple Vitamins-Minerals (ANTIOXIDANT PO), Take  by mouth., Disp: , Rfl:   •  Multiple Vitamins-Minerals (MULTIVITAMIN WITH MINERALS) tablet tablet, Take 1 tablet by mouth Daily., Disp: , Rfl:   •  vitamin C (ASCORBIC ACID) 500 MG tablet, Take 500 mg by mouth Daily., Disp: , Rfl:   •  Zinc Sulfate (ZINC 15 PO), Take  by mouth., Disp: , Rfl:      The 10-year ASCVD risk score (Karena CLARK, et al., 2019) is: 5.5%    Values used to calculate the score:      Age: 62 years      Sex: Male      Is Non- : No      Diabetic: No      Tobacco smoker: No      Systolic Blood Pressure: 96 mmHg      Is BP treated: No      HDL Cholesterol: 52  mg/dL      Total Cholesterol: 195 mg/dL       Assessment & Plan   Healthy male exam.      1. Healthcare Maintenance:  2. Patient Counseling:  --Nutrition: Stressed importance of moderation in sodium/caffeine intake, saturated fat and cholesterol, caloric balance, sufficient intake of fresh fruits, vegetables, fiber, calcium and vit D  --Exercise: he does exercise occas   --Substance Abuse: no tob no etoh  --Dental health: he does go to te dentist reg  --Immunizations reviewed.   --Discussed benefits of screening colonoscopy.  3.  URI-   He is recovering   4. HPL-  He is going to work on diet and exercise  5.  Int back pain-  He does stretching when ths starts  I will precribe

## 2023-09-06 ENCOUNTER — OFFICE VISIT (OUTPATIENT)
Dept: INTERNAL MEDICINE | Facility: CLINIC | Age: 63
End: 2023-09-06
Payer: COMMERCIAL

## 2023-09-06 VITALS
DIASTOLIC BLOOD PRESSURE: 68 MMHG | OXYGEN SATURATION: 97 % | HEIGHT: 66 IN | SYSTOLIC BLOOD PRESSURE: 96 MMHG | BODY MASS INDEX: 25.44 KG/M2 | HEART RATE: 77 BPM | WEIGHT: 158.3 LBS | TEMPERATURE: 98 F
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
## 2023-09-06 NOTE — PROGRESS NOTES
Subjective   Travis Taylor is a 63 y.o. male.   Fu tick bite    Insect Bite     He is s/p abx for a tick bite    The following portions of the patient's history were reviewed and updated as appropriate: allergies, current medications, past family history, past medical history, past social history, past surgical history, and problem list.    Review of Systems    Objective   Physical Exam  Vitals reviewed.   Constitutional:       Appearance: He is well-developed.   HENT:      Head: Normocephalic and atraumatic.      Right Ear: External ear normal.      Left Ear: External ear normal.   Eyes:      Conjunctiva/sclera: Conjunctivae normal.      Pupils: Pupils are equal, round, and reactive to light.   Neck:      Thyroid: No thyromegaly.      Trachea: No tracheal deviation.   Cardiovascular:      Rate and Rhythm: Normal rate and regular rhythm.      Heart sounds: Normal heart sounds.   Pulmonary:      Effort: Pulmonary effort is normal.      Breath sounds: Normal breath sounds.   Abdominal:      General: Bowel sounds are normal. There is no distension.      Palpations: Abdomen is soft.      Tenderness: There is no abdominal tenderness.   Musculoskeletal:         General: No deformity. Normal range of motion.      Cervical back: Normal range of motion.   Skin:     General: Skin is warm and dry.   Neurological:      Mental Status: He is alert and oriented to person, place, and time.   Psychiatric:         Behavior: Behavior normal.         Thought Content: Thought content normal.         Judgment: Judgment normal.       Vitals:    09/06/23 0918   BP: 96/68   Pulse: 77   Temp: 98 °F (36.7 °C)   SpO2: 97%     Body mass index is 25.55 kg/m².         Assessment & Plan   Diagnoses and all orders for this visit:    1. Tick bite, unspecified site, subsequent encounter (Primary)      Tick bite: Patient did have a rash and was started on doxycycline.  Rash is completely resolved except a little purple area but that seems to be  healing.  He had no systemic symptoms.  He will let me know if he has any other issues

## 2023-11-13 DIAGNOSIS — Z00.00 HEALTH CARE MAINTENANCE: ICD-10-CM

## 2023-11-13 DIAGNOSIS — E78.5 HYPERLIPIDEMIA, UNSPECIFIED HYPERLIPIDEMIA TYPE: ICD-10-CM

## 2023-11-15 LAB
ALBUMIN SERPL-MCNC: 4.7 G/DL (ref 3.5–5.2)
ALBUMIN/GLOB SERPL: 2.2 G/DL
ALP SERPL-CCNC: 67 U/L (ref 39–117)
ALT SERPL-CCNC: 25 U/L (ref 1–41)
AST SERPL-CCNC: 19 U/L (ref 1–40)
BASOPHILS # BLD AUTO: 0.03 10*3/MM3 (ref 0–0.2)
BASOPHILS NFR BLD AUTO: 0.8 % (ref 0–1.5)
BILIRUB SERPL-MCNC: 0.6 MG/DL (ref 0–1.2)
BUN SERPL-MCNC: 15 MG/DL (ref 8–23)
BUN/CREAT SERPL: 15.8 (ref 7–25)
CALCIUM SERPL-MCNC: 9.2 MG/DL (ref 8.6–10.5)
CHLORIDE SERPL-SCNC: 102 MMOL/L (ref 98–107)
CHOLEST SERPL-MCNC: 210 MG/DL (ref 0–200)
CO2 SERPL-SCNC: 30.3 MMOL/L (ref 22–29)
CREAT SERPL-MCNC: 0.95 MG/DL (ref 0.76–1.27)
EGFRCR SERPLBLD CKD-EPI 2021: 90.5 ML/MIN/1.73
EOSINOPHIL # BLD AUTO: 0.04 10*3/MM3 (ref 0–0.4)
EOSINOPHIL NFR BLD AUTO: 1 % (ref 0.3–6.2)
ERYTHROCYTE [DISTWIDTH] IN BLOOD BY AUTOMATED COUNT: 12.5 % (ref 12.3–15.4)
GLOBULIN SER CALC-MCNC: 2.1 GM/DL
GLUCOSE SERPL-MCNC: 88 MG/DL (ref 65–99)
HBA1C MFR BLD: 5.3 % (ref 4.8–5.6)
HCT VFR BLD AUTO: 48.5 % (ref 37.5–51)
HDLC SERPL-MCNC: 55 MG/DL (ref 40–60)
HGB BLD-MCNC: 16.6 G/DL (ref 13–17.7)
IMM GRANULOCYTES # BLD AUTO: 0 10*3/MM3 (ref 0–0.05)
IMM GRANULOCYTES NFR BLD AUTO: 0 % (ref 0–0.5)
LDLC SERPL CALC-MCNC: 141 MG/DL (ref 0–100)
LDLC/HDLC SERPL: 2.53 {RATIO}
LYMPHOCYTES # BLD AUTO: 0.52 10*3/MM3 (ref 0.7–3.1)
LYMPHOCYTES NFR BLD AUTO: 13.6 % (ref 19.6–45.3)
MCH RBC QN AUTO: 32.4 PG (ref 26.6–33)
MCHC RBC AUTO-ENTMCNC: 34.2 G/DL (ref 31.5–35.7)
MCV RBC AUTO: 94.7 FL (ref 79–97)
MONOCYTES # BLD AUTO: 0.46 10*3/MM3 (ref 0.1–0.9)
MONOCYTES NFR BLD AUTO: 12 % (ref 5–12)
NEUTROPHILS # BLD AUTO: 2.78 10*3/MM3 (ref 1.7–7)
NEUTROPHILS NFR BLD AUTO: 72.6 % (ref 42.7–76)
NRBC BLD AUTO-RTO: 0 /100 WBC (ref 0–0.2)
PLATELET # BLD AUTO: 262 10*3/MM3 (ref 140–450)
POTASSIUM SERPL-SCNC: 4.6 MMOL/L (ref 3.5–5.2)
PROT SERPL-MCNC: 6.8 G/DL (ref 6–8.5)
PSA SERPL-MCNC: 0.75 NG/ML (ref 0–4)
RBC # BLD AUTO: 5.12 10*6/MM3 (ref 4.14–5.8)
SODIUM SERPL-SCNC: 138 MMOL/L (ref 136–145)
TRIGL SERPL-MCNC: 78 MG/DL (ref 0–150)
TSH SERPL DL<=0.005 MIU/L-ACNC: 1.73 UIU/ML (ref 0.27–4.2)
VLDLC SERPL CALC-MCNC: 14 MG/DL (ref 5–40)
WBC # BLD AUTO: 3.83 10*3/MM3 (ref 3.4–10.8)

## 2023-11-21 ENCOUNTER — OFFICE VISIT (OUTPATIENT)
Dept: INTERNAL MEDICINE | Facility: CLINIC | Age: 63
End: 2023-11-21
Payer: COMMERCIAL

## 2023-11-21 VITALS
TEMPERATURE: 98.1 F | BODY MASS INDEX: 25.17 KG/M2 | WEIGHT: 156.6 LBS | HEART RATE: 75 BPM | DIASTOLIC BLOOD PRESSURE: 82 MMHG | SYSTOLIC BLOOD PRESSURE: 112 MMHG | HEIGHT: 66 IN | OXYGEN SATURATION: 98 %

## 2023-11-21 DIAGNOSIS — Z00.00 HEALTH CARE MAINTENANCE: Primary | ICD-10-CM

## 2023-11-21 PROCEDURE — 99396 PREV VISIT EST AGE 40-64: CPT | Performed by: INTERNAL MEDICINE

## 2023-11-21 NOTE — PROGRESS NOTES
"Subjective   Travis Taylor is a 63 y.o. male and is here for a comprehensive physical exam. The patient reports problems - no issues .    Occas use of mobic  does well with reg exercise    Do you take any herbs or supplements that were not prescribed by a doctor? See list      Social History: he does stay active  no reg weight routine  Social History     Socioeconomic History    Marital status:      Spouse name: JOSE    Number of children: 2    Years of education: College   Tobacco Use    Smoking status: Never    Smokeless tobacco: Never   Substance and Sexual Activity    Alcohol use: Yes     Comment: socially    Drug use: No    Sexual activity: Defer       Family History:   Family History   Problem Relation Age of Onset    Hypertension Mother     Arthritis Mother     Diabetes Mother     Stroke Father 85    Dementia Father     Depression Father     Lung cancer Paternal Grandfather         & possible mouth cancer; was a smoker.     Malig Hyperthermia Neg Hx        Past Medical History:   Past Medical History:   Diagnosis Date    DDD (degenerative disc disease), lumbar     L4-5    Fatigue     H/O Broken jaw (CMS/HCC) 1968    History of concussion 1968    Left hip pain     Leukopenia 2018    PONV (postoperative nausea and vomiting)            Review of Systems    Pertinent items are noted in HPI.    Objective   /82 (BP Location: Left arm, Patient Position: Sitting)   Pulse 75   Temp 98.1 °F (36.7 °C) (Oral)   Ht 167.6 cm (65.98\")   Wt 71 kg (156 lb 9.6 oz)   SpO2 98%   BMI 25.29 kg/m²     General Appearance:    Alert, cooperative, no distress, appears stated age   Head:    Normocephalic, without obvious abnormality, atraumatic   Eyes:    PERRL, conjunctiva/corneas clear, EOM's intact, fundi     benign, both eyes        Ears:    Normal TM's and external ear canals, both ears   Nose:   Nares normal, septum midline, mucosa normal, no drainage    or sinus tenderness   Throat:   Lips, mucosa, and " tongue normal; teeth and gums normal   Neck:   Supple, symmetrical, trachea midline, no adenopathy;        thyroid:  No enlargement/tenderness/nodules; no carotid    bruit or JVD   Back:     Symmetric, no curvature, ROM normal, no CVA tenderness   Lungs:     Clear to auscultation bilaterally, respirations unlabored   Chest wall:    No tenderness or deformity   Heart:    Regular rate and rhythm, S1 and S2 normal, no murmur, rub   or gallop   Abdomen:     Soft, non-tender, bowel sounds active all four quadrants,     no masses, no organomegaly           Extremities:   Extremities normal, atraumatic, no cyanosis or edema   Pulses:   2+ and symmetric all extremities   Skin:   Skin color, texture, turgor normal, no rashes or lesions   Lymph nodes:   Cervical, supraclavicular, and axillary nodes normal   Neurologic:   CNII-XII intact. Normal strength, sensation and reflexes       throughout       Vitals:    11/21/23 0910   BP: 112/82   Pulse: 75   Temp: 98.1 °F (36.7 °C)   SpO2: 98%     Body mass index is 25.29 kg/m².      Medications:   Current Outpatient Medications:     Calcium Carbonate (CALCIUM 500 PO), Take  by mouth., Disp: , Rfl:     meloxicam (Mobic) 15 MG tablet, Take 1 tablet by mouth Daily As Needed for Mild Pain., Disp: 30 tablet, Rfl: 0    metaxalone (Skelaxin) 800 MG tablet, Take 1 tablet by mouth 3 (Three) Times a Day As Needed for Muscle Spasms., Disp: 30 tablet, Rfl: 0    Multiple Vitamins-Minerals (ANTIOXIDANT PO), Take  by mouth., Disp: , Rfl:     Multiple Vitamins-Minerals (MULTIVITAMIN WITH MINERALS) tablet tablet, Take 1 tablet by mouth Daily., Disp: , Rfl:     vitamin C (ASCORBIC ACID) 500 MG tablet, Take 1 tablet by mouth Daily., Disp: , Rfl:     Zinc Sulfate (ZINC 15 PO), Take  by mouth., Disp: , Rfl:     BMI is >= 25 and <30. (Overweight) The following options were offered after discussion;: exercise counseling/recommendations and nutrition counseling/recommendations        Assessment & Plan    Healthy male exam.      1. Healthcare Maintenance:  2. Patient Counseling:  --Nutrition: Stressed importance of moderation in sodium/caffeine intake, saturated fat and cholesterol, caloric balance, sufficient intake of fresh fruits, vegetables, fiber, calcium and vit D  --Exercise: no tob no etoh  --Substance Abuse:  no tob no etoh  --Dental health: he does go to the dentist reg  --Immunizations reviewed.  utd  --Discussed benefits of screening colonoscopy.

## 2024-05-16 ENCOUNTER — OFFICE VISIT (OUTPATIENT)
Dept: INTERNAL MEDICINE | Facility: CLINIC | Age: 64
End: 2024-05-16
Payer: COMMERCIAL

## 2024-05-16 VITALS
HEIGHT: 66 IN | WEIGHT: 158.1 LBS | BODY MASS INDEX: 25.41 KG/M2 | OXYGEN SATURATION: 98 % | TEMPERATURE: 98.4 F | DIASTOLIC BLOOD PRESSURE: 68 MMHG | SYSTOLIC BLOOD PRESSURE: 92 MMHG | HEART RATE: 77 BPM

## 2024-05-16 DIAGNOSIS — R30.0 DYSURIA: Primary | ICD-10-CM

## 2024-05-16 LAB
BILIRUB BLD-MCNC: NEGATIVE MG/DL
CLARITY, POC: ABNORMAL
COLOR UR: YELLOW
EXPIRATION DATE: ABNORMAL
GLUCOSE UR STRIP-MCNC: NEGATIVE MG/DL
KETONES UR QL: NEGATIVE
LEUKOCYTE EST, POC: ABNORMAL
Lab: ABNORMAL
NITRITE UR-MCNC: NEGATIVE MG/ML
PH UR: 6.5 [PH] (ref 5–8)
PROT UR STRIP-MCNC: ABNORMAL MG/DL
RBC # UR STRIP: ABNORMAL /UL
SP GR UR: 1.02 (ref 1–1.03)
UROBILINOGEN UR QL: ABNORMAL

## 2024-05-16 PROCEDURE — 99214 OFFICE O/P EST MOD 30 MIN: CPT | Performed by: INTERNAL MEDICINE

## 2024-05-16 PROCEDURE — 81003 URINALYSIS AUTO W/O SCOPE: CPT | Performed by: INTERNAL MEDICINE

## 2024-05-16 RX ORDER — SULFAMETHOXAZOLE AND TRIMETHOPRIM 800; 160 MG/1; MG/1
1 TABLET ORAL 2 TIMES DAILY
Qty: 28 TABLET | Refills: 0 | Status: SHIPPED | OUTPATIENT
Start: 2024-05-16

## 2024-05-16 NOTE — PROGRESS NOTES
Subjective   Travis Taylor is a 63 y.o. male.   Dysuria for 10 days  History of Present Illness   No blood increased freq and burning on urination    The following portions of the patient's history were reviewed and updated as appropriate: allergies, current medications, past family history, past medical history, past social history, past surgical history, and problem list.    Review of Systems    Objective   Physical Exam    Vitals:    05/16/24 0804   BP: 92/68   Pulse: 77   Temp: 98.4 °F (36.9 °C)   SpO2: 98%     Body mass index is 25.53 kg/m².         Assessment & Plan   Diagnoses and all orders for this visit:    1. Dysuria (Primary)  -     POC Urinalysis Dipstick, Automated  -     Urine Culture - Urine, Urine, Clean Catch    Other orders  -     sulfamethoxazole-trimethoprim (Bactrim DS) 800-160 MG per tablet; Take 1 tablet by mouth 2 (Two) Times a Day.  Dispense: 28 tablet; Refill: 0      UTI: Patient has no significant risk factors.  He has not been having any urinary symptoms prior to the symptoms of this infection starting 10 days ago or so.  I encouraged her to drink plenty of fluids.  Take the antibiotic twice a day and a yogurt or probiotic in the middle of the day.  We also will send a culture and get back with him with the results.  He is going to check a urinalysis a couple of weeks after he completes the antibiotics

## 2024-05-19 LAB
BACTERIA UR CULT: ABNORMAL
BACTERIA UR CULT: ABNORMAL

## 2024-05-22 DIAGNOSIS — R30.0 DYSURIA: Primary | ICD-10-CM

## 2024-06-12 LAB
BACTERIA UR CULT: NO GROWTH
BACTERIA UR CULT: NORMAL

## 2024-08-08 RX ORDER — METAXALONE 800 MG/1
800 TABLET ORAL 3 TIMES DAILY PRN
Qty: 30 TABLET | Refills: 0 | Status: SHIPPED | OUTPATIENT
Start: 2024-08-08

## 2024-08-08 RX ORDER — MELOXICAM 15 MG/1
15 TABLET ORAL DAILY PRN
Qty: 30 TABLET | Refills: 0 | Status: SHIPPED | OUTPATIENT
Start: 2024-08-08

## 2024-09-05 ENCOUNTER — OFFICE VISIT (OUTPATIENT)
Dept: INTERNAL MEDICINE | Facility: CLINIC | Age: 64
End: 2024-09-05
Payer: COMMERCIAL

## 2024-09-05 VITALS
SYSTOLIC BLOOD PRESSURE: 122 MMHG | HEIGHT: 66 IN | WEIGHT: 150.9 LBS | HEART RATE: 79 BPM | OXYGEN SATURATION: 99 % | DIASTOLIC BLOOD PRESSURE: 78 MMHG | TEMPERATURE: 98.2 F | BODY MASS INDEX: 24.25 KG/M2

## 2024-09-05 DIAGNOSIS — R30.0 DYSURIA: Primary | ICD-10-CM

## 2024-09-05 LAB
BILIRUB BLD-MCNC: NEGATIVE MG/DL
CLARITY, POC: CLEAR
COLOR UR: YELLOW
EXPIRATION DATE: ABNORMAL
GLUCOSE UR STRIP-MCNC: NEGATIVE MG/DL
KETONES UR QL: NEGATIVE
LEUKOCYTE EST, POC: ABNORMAL
Lab: ABNORMAL
NITRITE UR-MCNC: NEGATIVE MG/ML
PH UR: 5.5 [PH] (ref 5–8)
PROT UR STRIP-MCNC: NEGATIVE MG/DL
RBC # UR STRIP: NEGATIVE /UL
SP GR UR: 1.01 (ref 1–1.03)
UROBILINOGEN UR QL: ABNORMAL

## 2024-09-05 PROCEDURE — 99214 OFFICE O/P EST MOD 30 MIN: CPT | Performed by: INTERNAL MEDICINE

## 2024-09-05 PROCEDURE — 81003 URINALYSIS AUTO W/O SCOPE: CPT | Performed by: INTERNAL MEDICINE

## 2024-09-05 RX ORDER — SULFAMETHOXAZOLE/TRIMETHOPRIM 800-160 MG
1 TABLET ORAL 2 TIMES DAILY
Qty: 28 TABLET | Refills: 0 | Status: SHIPPED | OUTPATIENT
Start: 2024-09-05

## 2024-09-05 NOTE — PROGRESS NOTES
Subjective   Travis Taylor is a 64 y.o. male.     Urinary Retention     Patient has been having more urinary retention a little bit of burning on urination.  He has never had problems with his urinary tract until he had an infection last spring.  It did resolve with Bactrim.  He has been completely fine until recently.  No odor no fevers or chills.  He denies any trauma.  He is about to go on a trip to [place].    The following portions of the patient's history were reviewed and updated as appropriate: allergies, current medications, past family history, past medical history, past social history, past surgical history, and problem list.  No history of  surgeries  Review of Systems    Objective   Physical Exam  Vitals reviewed.   Constitutional:       Appearance: He is well-developed.   HENT:      Head: Normocephalic and atraumatic.      Right Ear: External ear normal.      Left Ear: External ear normal.   Eyes:      Conjunctiva/sclera: Conjunctivae normal.      Pupils: Pupils are equal, round, and reactive to light.   Neck:      Thyroid: No thyromegaly.      Trachea: No tracheal deviation.   Cardiovascular:      Rate and Rhythm: Normal rate and regular rhythm.      Heart sounds: Normal heart sounds.   Pulmonary:      Effort: Pulmonary effort is normal.      Breath sounds: Normal breath sounds.   Abdominal:      General: Bowel sounds are normal. There is no distension.      Palpations: Abdomen is soft.      Tenderness: There is no abdominal tenderness.   Musculoskeletal:         General: No deformity. Normal range of motion.      Cervical back: Normal range of motion.   Skin:     General: Skin is warm and dry.   Neurological:      Mental Status: He is alert and oriented to person, place, and time.   Psychiatric:         Behavior: Behavior normal.         Thought Content: Thought content normal.         Judgment: Judgment normal.       Vitals:    09/05/24 1001   BP: 122/78   Pulse: 79   Temp: 98.2 °F (36.8 °C)    SpO2: 99%     Body mass index is 24.37 kg/m².         Assessment & Plan   Diagnoses and all orders for this visit:    1. Dysuria (Primary)  -     POCT urinalysis dipstick, automated  -     Urine Culture - Urine, Urine, Clean Catch    Other orders  -     sulfamethoxazole-trimethoprim (Bactrim DS) 800-160 MG per tablet; Take 1 tablet by mouth 2 (Two) Times a Day.  Dispense: 28 tablet; Refill: 0     Recurrent uti-I am going to go ahead and send in with Bactrim.  Will contact him through [facility] when we get the culture back.  I have recommended pushing fluids and taking a probiotic in between the dosing because I do not want him to develop diarrhea while he is on his trip.  I know he tolerated the Bactrim last time so we will do that again.  He is likely going to need to see urology on his return

## 2024-09-06 ENCOUNTER — TELEPHONE (OUTPATIENT)
Dept: INTERNAL MEDICINE | Facility: CLINIC | Age: 64
End: 2024-09-06
Payer: COMMERCIAL

## 2024-09-06 RX ORDER — CIPROFLOXACIN 500 MG/1
500 TABLET, FILM COATED ORAL 2 TIMES DAILY
Qty: 28 TABLET | Refills: 0 | Status: SHIPPED | OUTPATIENT
Start: 2024-09-06

## 2024-09-06 NOTE — TELEPHONE ENCOUNTER
Patient is having a lot of nausea and headache related to taking the Bactrim.  We are going to discontinue this and change him over to Cipro 500 mg twice a day for 2 weeks.  Culture should be back next week    Caller: Travis Taylor    Relationship: Self    Best call back number: [phone]     Which medication are you concerned about: sulfamethoxazole-trimethoprim (Bactrim DS) 800-160 MG per tablet     Who prescribed you this medication: DR POWELL    When did you start taking this medication: 9/5/24    What are your concerns: SEVERE HEADACHE, NAUSEA, BODY ACHES. REQUESTS CALL BACK TO DISCUSS ALTERNATIVE MEDICATION

## 2024-09-07 LAB
BACTERIA UR CULT: ABNORMAL
BACTERIA UR CULT: ABNORMAL

## 2024-09-10 ENCOUNTER — PATIENT MESSAGE (OUTPATIENT)
Dept: INTERNAL MEDICINE | Facility: CLINIC | Age: 64
End: 2024-09-10
Payer: COMMERCIAL

## 2024-09-24 DIAGNOSIS — R30.0 DYSURIA: Primary | ICD-10-CM

## 2024-09-25 LAB
APPEARANCE UR: CLEAR
BACTERIA #/AREA URNS HPF: NORMAL /HPF
BILIRUB UR QL STRIP: NEGATIVE
CASTS URNS MICRO: NORMAL
COLOR UR: YELLOW
EPI CELLS #/AREA URNS HPF: NORMAL /HPF
GLUCOSE UR QL STRIP: NEGATIVE
HGB UR QL STRIP: NEGATIVE
KETONES UR QL STRIP: NEGATIVE
LEUKOCYTE ESTERASE UR QL STRIP: NEGATIVE
NITRITE UR QL STRIP: NEGATIVE
PH UR STRIP: 6 [PH] (ref 5–8)
PROT UR QL STRIP: NEGATIVE
RBC #/AREA URNS HPF: NORMAL /HPF
SP GR UR STRIP: 1.03 (ref 1–1.03)
UROBILINOGEN UR STRIP-MCNC: NORMAL MG/DL
WBC #/AREA URNS HPF: NORMAL /HPF

## 2024-09-26 DIAGNOSIS — R30.0 DYSURIA: Primary | ICD-10-CM

## 2024-09-26 LAB
BACTERIA UR CULT: NO GROWTH
BACTERIA UR CULT: NORMAL

## 2024-11-05 DIAGNOSIS — Z00.00 HEALTH CARE MAINTENANCE: Primary | ICD-10-CM

## 2024-11-06 LAB
ALBUMIN SERPL-MCNC: 4.3 G/DL (ref 3.5–5.2)
ALBUMIN/GLOB SERPL: 1.9 G/DL
ALP SERPL-CCNC: 69 U/L (ref 39–117)
ALT SERPL-CCNC: 28 U/L (ref 1–41)
AST SERPL-CCNC: 22 U/L (ref 1–40)
BASOPHILS # BLD AUTO: 0.02 10*3/MM3 (ref 0–0.2)
BASOPHILS NFR BLD AUTO: 0.6 % (ref 0–1.5)
BILIRUB SERPL-MCNC: 0.6 MG/DL (ref 0–1.2)
BUN SERPL-MCNC: 11 MG/DL (ref 8–23)
BUN/CREAT SERPL: 12.9 (ref 7–25)
CALCIUM SERPL-MCNC: 8.8 MG/DL (ref 8.6–10.5)
CHLORIDE SERPL-SCNC: 105 MMOL/L (ref 98–107)
CHOLEST SERPL-MCNC: 180 MG/DL (ref 0–200)
CO2 SERPL-SCNC: 28 MMOL/L (ref 22–29)
CREAT SERPL-MCNC: 0.85 MG/DL (ref 0.76–1.27)
EGFRCR SERPLBLD CKD-EPI 2021: 97.6 ML/MIN/1.73
EOSINOPHIL # BLD AUTO: 0.06 10*3/MM3 (ref 0–0.4)
EOSINOPHIL NFR BLD AUTO: 1.8 % (ref 0.3–6.2)
ERYTHROCYTE [DISTWIDTH] IN BLOOD BY AUTOMATED COUNT: 12.8 % (ref 12.3–15.4)
GLOBULIN SER CALC-MCNC: 2.3 GM/DL
GLUCOSE SERPL-MCNC: 89 MG/DL (ref 65–99)
HCT VFR BLD AUTO: 45.9 % (ref 37.5–51)
HDLC SERPL-MCNC: 58 MG/DL (ref 40–60)
HGB BLD-MCNC: 15 G/DL (ref 13–17.7)
IMM GRANULOCYTES # BLD AUTO: 0.01 10*3/MM3 (ref 0–0.05)
IMM GRANULOCYTES NFR BLD AUTO: 0.3 % (ref 0–0.5)
LDLC SERPL CALC-MCNC: 113 MG/DL (ref 0–100)
LDLC/HDLC SERPL: 1.94 {RATIO}
LYMPHOCYTES # BLD AUTO: 0.56 10*3/MM3 (ref 0.7–3.1)
LYMPHOCYTES NFR BLD AUTO: 16.6 % (ref 19.6–45.3)
MCH RBC QN AUTO: 31.3 PG (ref 26.6–33)
MCHC RBC AUTO-ENTMCNC: 32.7 G/DL (ref 31.5–35.7)
MCV RBC AUTO: 95.6 FL (ref 79–97)
MONOCYTES # BLD AUTO: 0.41 10*3/MM3 (ref 0.1–0.9)
MONOCYTES NFR BLD AUTO: 12.1 % (ref 5–12)
NEUTROPHILS # BLD AUTO: 2.32 10*3/MM3 (ref 1.7–7)
NEUTROPHILS NFR BLD AUTO: 68.6 % (ref 42.7–76)
NRBC BLD AUTO-RTO: 0 /100 WBC (ref 0–0.2)
PLATELET # BLD AUTO: 266 10*3/MM3 (ref 140–450)
POTASSIUM SERPL-SCNC: 4.9 MMOL/L (ref 3.5–5.2)
PROT SERPL-MCNC: 6.6 G/DL (ref 6–8.5)
PSA SERPL-MCNC: 0.69 NG/ML (ref 0–4)
RBC # BLD AUTO: 4.8 10*6/MM3 (ref 4.14–5.8)
SODIUM SERPL-SCNC: 139 MMOL/L (ref 136–145)
TRIGL SERPL-MCNC: 46 MG/DL (ref 0–150)
TSH SERPL DL<=0.005 MIU/L-ACNC: 1.49 UIU/ML (ref 0.27–4.2)
VLDLC SERPL CALC-MCNC: 9 MG/DL (ref 5–40)
WBC # BLD AUTO: 3.38 10*3/MM3 (ref 3.4–10.8)

## 2024-11-22 ENCOUNTER — OFFICE VISIT (OUTPATIENT)
Dept: INTERNAL MEDICINE | Facility: CLINIC | Age: 64
End: 2024-11-22
Payer: COMMERCIAL

## 2024-11-22 VITALS
WEIGHT: 152.4 LBS | SYSTOLIC BLOOD PRESSURE: 94 MMHG | BODY MASS INDEX: 24.49 KG/M2 | HEART RATE: 68 BPM | TEMPERATURE: 98.3 F | OXYGEN SATURATION: 99 % | DIASTOLIC BLOOD PRESSURE: 62 MMHG | HEIGHT: 66 IN

## 2024-11-22 DIAGNOSIS — Z00.00 HEALTH CARE MAINTENANCE: Primary | ICD-10-CM

## 2024-11-22 DIAGNOSIS — E78.5 HYPERLIPIDEMIA, UNSPECIFIED HYPERLIPIDEMIA TYPE: ICD-10-CM

## 2024-11-22 PROCEDURE — 99396 PREV VISIT EST AGE 40-64: CPT | Performed by: INTERNAL MEDICINE

## 2024-11-22 NOTE — PROGRESS NOTES
"Subjective   Travis Taylor is a 64 y.o. male and is here for a comprehensive physical exam. The patient reports problems - low blood pressure    He has been eating better  less cheese and fried food  BP also lower as well but he feels ok    Do you take any herbs or supplements that were not prescribed by a doctor?       Social History: no tob no etoh  Social History     Socioeconomic History    Marital status:      Spouse name: JOSE    Number of children: 2    Years of education: College   Tobacco Use    Smoking status: Never    Smokeless tobacco: Never   Vaping Use    Vaping status: Never Used   Substance and Sexual Activity    Alcohol use: Yes     Alcohol/week: 2.0 standard drinks of alcohol     Types: 2 Cans of beer per week     Comment: socially    Drug use: No    Sexual activity: Yes     Partners: Female       Family History:   Family History   Problem Relation Age of Onset    Hypertension Mother     Arthritis Mother     Diabetes Mother     Asthma Mother     Hearing loss Mother     Stroke Father 85    Dementia Father     Depression Father     Lung cancer Paternal Grandfather         & possible mouth cancer; was a smoker.     Malig Hyperthermia Neg Hx        Past Medical History:   Past Medical History:   Diagnosis Date    DDD (degenerative disc disease), lumbar     L4-5    Fatigue     H/O Broken jaw (CMS/HCC) 1968    History of concussion 1968    Left hip pain     Leukopenia 2018    PONV (postoperative nausea and vomiting)            Review of Systems    Pertinent items are noted in HPI.    Objective   BP 94/62 (BP Location: Left arm, Patient Position: Sitting)   Pulse 68   Temp 98.3 °F (36.8 °C) (Oral)   Ht 167.6 cm (65.98\")   Wt 69.1 kg (152 lb 6.4 oz)   SpO2 99%   BMI 24.61 kg/m²     General Appearance:    Alert, cooperative, no distress, appears stated age   Head:    Normocephalic, without obvious abnormality, atraumatic   Eyes:    PERRL, conjunctiva/corneas clear, EOM's intact, fundi     " benign, both eyes        Ears:    Normal TM's and external ear canals, both ears   Nose:   Nares normal, septum midline, mucosa normal, no drainage    or sinus tenderness   Throat:   Lips, mucosa, and tongue normal; teeth and gums normal   Neck:   Supple, symmetrical, trachea midline, no adenopathy;        thyroid:  No enlargement/tenderness/nodules; no carotid    bruit or JVD   Back:     Symmetric, no curvature, ROM normal, no CVA tenderness   Lungs:     Clear to auscultation bilaterally, respirations unlabored   Chest wall:    No tenderness or deformity   Heart:    Regular rate and rhythm, S1 and S2 normal, no murmur, rub   or gallop   Abdomen:     Soft, non-tender, bowel sounds active all four quadrants,     no masses, no organomegaly           Extremities:   Extremities normal, atraumatic, no cyanosis or edema   Pulses:   2+ and symmetric all extremities   Skin:   Skin color, texture, turgor normal, no rashes or lesions   Lymph nodes:   Cervical, supraclavicular, and axillary nodes normal   Neurologic:   CNII-XII intact. Normal strength, sensation and reflexes       throughout       Vitals:    11/22/24 0933   BP: 94/62   Pulse: 68   Temp: 98.3 °F (36.8 °C)   SpO2: 99%     Body mass index is 24.61 kg/m².      Medications:   Current Outpatient Medications:     Calcium Carbonate (CALCIUM 500 PO), Take  by mouth., Disp: , Rfl:     meloxicam (Mobic) 15 MG tablet, Take 1 tablet by mouth Daily As Needed for Mild Pain., Disp: 30 tablet, Rfl: 0    metaxalone (Skelaxin) 800 MG tablet, Take 1 tablet by mouth 3 (Three) Times a Day As Needed for Muscle Spasms., Disp: 30 tablet, Rfl: 0    Multiple Vitamins-Minerals (ANTIOXIDANT PO), Take  by mouth., Disp: , Rfl:     Multiple Vitamins-Minerals (MULTIVITAMIN WITH MINERALS) tablet tablet, Take 1 tablet by mouth Daily., Disp: , Rfl:     vitamin C (ASCORBIC ACID) 500 MG tablet, Take 1 tablet by mouth Daily., Disp: , Rfl:     Zinc Sulfate (ZINC 15 PO), Take  by mouth., Disp: , Rfl:      BMI is within normal parameters. No other follow-up for BMI required.        Assessment & Plan   Healthy male exam.      1. Healthcare Maintenance:  2. Patient Counseling:  --Nutrition: Stressed importance of moderation in sodium/caffeine intake, saturated fat and cholesterol, caloric balance, sufficient intake of fresh fruits, vegetables, fiber, calcium and vit D  --Exercise:he does exercise reg  --Substance Abuse: no tob no etoh  --Dental health: he does go to the the dentist reg  --Immunizations reviewed.  --Discussed benefits of screening colonoscopy.  3.  Recurrent UTIs this spring: He has no symptoms now he has a follow-up appointment with urology today  4.  Hyperlipidemia: Patient is doing very well.  He is going to recheck a vascular screen as he is not on a statin

## 2025-03-10 ENCOUNTER — RESULTS FOLLOW-UP (OUTPATIENT)
Dept: INTERNAL MEDICINE | Facility: CLINIC | Age: 65
End: 2025-03-10
Payer: COMMERCIAL

## 2025-03-10 DIAGNOSIS — R68.89 ABNORMAL ANKLE BRACHIAL INDEX (ABI): Primary | ICD-10-CM

## 2025-03-14 ENCOUNTER — TELEPHONE (OUTPATIENT)
Dept: INTERNAL MEDICINE | Facility: CLINIC | Age: 65
End: 2025-03-14
Payer: COMMERCIAL

## 2025-03-14 DIAGNOSIS — R68.89 ABNORMAL ANKLE BRACHIAL INDEX (ABI): Primary | ICD-10-CM

## 2025-03-14 NOTE — TELEPHONE ENCOUNTER
----- Message from Merissa MONTGOMERY sent at 3/14/2025 11:17 AM EDT -----  The Order you placed for US Ankle/branchial ........, I'm think might be a wrong Order.  That was the same Order you placed for Mr. Lang and they returned it stating it had to be a vascular Order and you had to change his.  Thinking this might be the same with this patient.  I haven't bounced the Order over yet as wanted to check with you first.

## 2025-04-24 ENCOUNTER — HOSPITAL ENCOUNTER (OUTPATIENT)
Dept: CARDIOLOGY | Facility: HOSPITAL | Age: 65
Discharge: HOME | End: 2025-04-24
Admitting: INTERNAL MEDICINE
Payer: COMMERCIAL

## 2025-04-24 DIAGNOSIS — R68.89 ABNORMAL ANKLE BRACHIAL INDEX (ABI): ICD-10-CM

## 2025-04-24 LAB
BH CV LOWER ARTERIAL LEFT ABI RATIO: 1.2
BH CV LOWER ARTERIAL LEFT DORSALIS PEDIS SYS MAX: 138
BH CV LOWER ARTERIAL LEFT GREAT TOE SYS MAX: 87
BH CV LOWER ARTERIAL LEFT POST TIBIAL SYS MAX: 135
BH CV LOWER ARTERIAL LEFT TBI RATIO: 87
BH CV LOWER ARTERIAL RIGHT ABI RATIO: 1.25
BH CV LOWER ARTERIAL RIGHT DORSALIS PEDIS SYS MAX: 137
BH CV LOWER ARTERIAL RIGHT GREAT TOE SYS MAX: 130
BH CV LOWER ARTERIAL RIGHT POST TIBIAL SYS MAX: 144
BH CV LOWER ARTERIAL RIGHT TBI RATIO: 1.13
UPPER ARTERIAL LEFT ARM BRACHIAL SYS MAX: 99
UPPER ARTERIAL RIGHT ARM BRACHIAL SYS MAX: 115

## 2025-04-24 PROCEDURE — 93922 UPR/L XTREMITY ART 2 LEVELS: CPT

## (undated) DEVICE — SUT VICRYL 1 CT1 27IN  JJ40G

## (undated) DEVICE — MAT FLR ABSORBENT LG 4FT 10 2.5FT

## (undated) DEVICE — MEDI-VAC YANKAUER SUCTION HANDLE W/BULBOUS TIP: Brand: CARDINAL HEALTH

## (undated) DEVICE — GLV SURG SENSICARE PI MIC PF SZ7 LF STRL

## (undated) DEVICE — ANTIBACTERIAL UNDYED BRAIDED (POLYGLACTIN 910), SYNTHETIC ABSORBABLE SUTURE: Brand: COATED VICRYL

## (undated) DEVICE — PREMIUM WET SKIN PREP TRAY: Brand: MEDLINE INDUSTRIES, INC.

## (undated) DEVICE — PK ANT HIP 40

## (undated) DEVICE — APPL DURAPREP IODOPHOR APL 26ML

## (undated) DEVICE — STPLR SKIN VISISTAT WD 35CT

## (undated) DEVICE — SUT PDS 1 CT1 36IN Z347H

## (undated) DEVICE — PENCL E/S ULTRAVAC TELESCP NOSE HOLSTR 10FT

## (undated) DEVICE — Device

## (undated) DEVICE — SUT VIC 0 CT1 36IN J946H

## (undated) DEVICE — GLV SURG PREMIERPRO ORTHO LTX PF SZ7.5 BRN

## (undated) DEVICE — TRAP FLD MINIVAC MEGADYNE 100ML

## (undated) DEVICE — GLV SURG SENSICARE W/ALOE PF LF 8 STRL

## (undated) DEVICE — PREP SOL POVIDONE/IODINE BT 4OZ

## (undated) DEVICE — ADHS SKIN DERMABOND TOP ADVANCED

## (undated) DEVICE — GLV SURG SENSICARE W/ALOE PF LF 7.5 STRL

## (undated) DEVICE — GLV SURG SENSICARE PI PF LF 7 GRN STRL